# Patient Record
Sex: FEMALE | Race: WHITE | NOT HISPANIC OR LATINO | Employment: UNEMPLOYED | URBAN - METROPOLITAN AREA
[De-identification: names, ages, dates, MRNs, and addresses within clinical notes are randomized per-mention and may not be internally consistent; named-entity substitution may affect disease eponyms.]

---

## 2017-06-26 ENCOUNTER — HOSPITAL ENCOUNTER (OUTPATIENT)
Dept: MAMMOGRAPHY | Facility: CLINIC | Age: 52
Discharge: HOME | End: 2017-06-26
Attending: NURSE PRACTITIONER | Admitting: NURSE PRACTITIONER
Payer: COMMERCIAL

## 2017-06-26 DIAGNOSIS — Z12.31 VISIT FOR SCREENING MAMMOGRAM: ICD-10-CM

## 2017-06-26 PROCEDURE — G0202 SCR MAMMO BI INCL CAD: HCPCS

## 2017-08-09 ENCOUNTER — TELEPHONE (OUTPATIENT)
Dept: FAMILY MEDICINE | Facility: CLINIC | Age: 52
End: 2017-08-09

## 2017-08-09 DIAGNOSIS — E78.5 HYPERLIPIDEMIA LDL GOAL <160: ICD-10-CM

## 2017-08-09 DIAGNOSIS — E16.2 HYPOGLYCEMIA: Primary | ICD-10-CM

## 2017-08-09 NOTE — TELEPHONE ENCOUNTER
Reason for Call: Request for an order or referral:    Order or referral being requested: Pt is coming in early for her fasting labs, prior to her appt 08/22 with ALLISON Reyna.  Please place orders.  No need to call patient back, unless there are questions or problems.      Date needed: at your convenience    Has the patient been seen by the PCP for this problem? NO    Additional comments:     Phone number Patient can be reached at:  Cell number on file:    Telephone Information:   Mobile [phone]       Best Time:  any    Can we leave a detailed message on this number?  YES    Call taken on 8/9/2017 at 10:17 AM by Katerine Machado

## 2017-09-20 ENCOUNTER — RADIANT APPOINTMENT (OUTPATIENT)
Dept: GENERAL RADIOLOGY | Facility: CLINIC | Age: 52
End: 2017-09-20
Attending: NURSE PRACTITIONER
Payer: COMMERCIAL

## 2017-09-20 ENCOUNTER — OFFICE VISIT (OUTPATIENT)
Dept: FAMILY MEDICINE | Facility: CLINIC | Age: 52
End: 2017-09-20
Payer: COMMERCIAL

## 2017-09-20 VITALS
SYSTOLIC BLOOD PRESSURE: 112 MMHG | HEIGHT: 66 IN | HEART RATE: 67 BPM | DIASTOLIC BLOOD PRESSURE: 80 MMHG | TEMPERATURE: 98.4 F | BODY MASS INDEX: 24.62 KG/M2 | WEIGHT: 153.2 LBS

## 2017-09-20 DIAGNOSIS — M70.61 TROCHANTERIC BURSITIS OF BOTH HIPS: ICD-10-CM

## 2017-09-20 DIAGNOSIS — Z23 NEED FOR PROPHYLACTIC VACCINATION AND INOCULATION AGAINST INFLUENZA: ICD-10-CM

## 2017-09-20 DIAGNOSIS — M25.561 RIGHT KNEE PAIN, UNSPECIFIED CHRONICITY: ICD-10-CM

## 2017-09-20 DIAGNOSIS — M25.562 LEFT KNEE PAIN, UNSPECIFIED CHRONICITY: ICD-10-CM

## 2017-09-20 DIAGNOSIS — E16.2 HYPOGLYCEMIA: ICD-10-CM

## 2017-09-20 DIAGNOSIS — E78.5 HYPERLIPIDEMIA LDL GOAL <160: ICD-10-CM

## 2017-09-20 DIAGNOSIS — M25.50 MULTIPLE JOINT PAIN: ICD-10-CM

## 2017-09-20 DIAGNOSIS — M70.62 TROCHANTERIC BURSITIS OF BOTH HIPS: ICD-10-CM

## 2017-09-20 DIAGNOSIS — Z00.01 ENCOUNTER FOR ROUTINE ADULT HEALTH EXAMINATION WITH ABNORMAL FINDINGS: Primary | ICD-10-CM

## 2017-09-20 DIAGNOSIS — E55.9 VITAMIN D DEFICIENCY: ICD-10-CM

## 2017-09-20 LAB
CHOLEST SERPL-MCNC: 264 MG/DL
DEPRECATED CALCIDIOL+CALCIFEROL SERPL-MC: 35 UG/L (ref 20–75)
GLUCOSE SERPL-MCNC: 86 MG/DL (ref 70–99)
HDLC SERPL-MCNC: 67 MG/DL
LDLC SERPL CALC-MCNC: 152 MG/DL
NONHDLC SERPL-MCNC: 197 MG/DL
TRIGL SERPL-MCNC: 226 MG/DL

## 2017-09-20 PROCEDURE — 36415 COLL VENOUS BLD VENIPUNCTURE: CPT | Performed by: NURSE PRACTITIONER

## 2017-09-20 PROCEDURE — 99213 OFFICE O/P EST LOW 20 MIN: CPT | Mod: 25 | Performed by: NURSE PRACTITIONER

## 2017-09-20 PROCEDURE — 82306 VITAMIN D 25 HYDROXY: CPT | Performed by: NURSE PRACTITIONER

## 2017-09-20 PROCEDURE — 99396 PREV VISIT EST AGE 40-64: CPT | Mod: 25 | Performed by: NURSE PRACTITIONER

## 2017-09-20 PROCEDURE — 73560 X-RAY EXAM OF KNEE 1 OR 2: CPT | Mod: LT

## 2017-09-20 PROCEDURE — 90471 IMMUNIZATION ADMIN: CPT | Performed by: NURSE PRACTITIONER

## 2017-09-20 PROCEDURE — 80061 LIPID PANEL: CPT | Performed by: NURSE PRACTITIONER

## 2017-09-20 PROCEDURE — 82947 ASSAY GLUCOSE BLOOD QUANT: CPT | Performed by: NURSE PRACTITIONER

## 2017-09-20 PROCEDURE — 90686 IIV4 VACC NO PRSV 0.5 ML IM: CPT | Performed by: NURSE PRACTITIONER

## 2017-09-20 NOTE — PATIENT INSTRUCTIONS
Preventive Health Recommendations  Female Ages 40 to 49    Yearly exam:     See your health care provider every year in order to  1. Review health changes.   2. Discuss preventive care.    3. Review your medicines if your doctor prescribed any.      Get a Pap test every three years (unless you have an abnormal result and your provider advises testing more often).      If you get Pap tests with HPV test, you only need to test every 5 years, unless you have an abnormal result. You do not need a Pap test if your uterus was removed (hysterectomy) and you have not had cancer.      You should be tested each year for STDs (sexually transmitted diseases), if you're at risk.       Ask your doctor if you should have a mammogram.      Have a colonoscopy (test for colon cancer) if someone in your family has had colon cancer or polyps before age 50.       Have a cholesterol test every 5 years.       Have a diabetes test (fasting glucose) after age 45. If you are at risk for diabetes, you should have this test every 3 years.    Shots: Get a flu shot each year. Get a tetanus shot every 10 years.     Nutrition:     Eat at least 5 servings of fruits and vegetables each day.    Eat whole-grain bread, whole-wheat pasta and brown rice instead of white grains and rice.    Talk to your provider about Calcium and Vitamin D.     Lifestyle    Exercise at least 150 minutes a week (an average of 30 minutes a day, 5 days a week). This will help you control your weight and prevent disease.    Limit alcohol to one drink per day.    No smoking.     Wear sunscreen to prevent skin cancer.  See your dentist every six months for an exam and cleaning.  Knee Pain  Knee pain is very common. It s especially common in active people who put a lot of pressure on their knees, like runners. It affects women more often than men.  Your kneecap (patella) is a thick, round bone. It covers and protects the front portion of your knee joint. It moves along a  groove in your thighbone (femur) as part of the patellofemoral joint. A layer of cartilage surrounds the underside of your kneecap. This layer protects it from grinding against your femur.  When this cartilage softens and breaks down, it can cause knee pain. This is partly because of repetitive stress. The stress irritates the lining of the joint. This causes pain in the underlying bone.  What causes knee pain?  Many things can cause knee pain. You may have more than one cause. Some of these include:  Overuse of the knee joint  The kneecap doesn t line up with the tissue around it  Damage to small nerves in the area  Damage to the ligament-like structure that holds the kneecap in place (retinaculum)  Breakdown of the bone under the cartilage  Swelling in the soft tissues around the kneecap  Injury  You might be more likely to have knee pain if you:  Exercise a lot  Recently increased the intensity of your workouts  Have a body mass index (BMI) greater than 25  Have poor alignment of your kneecap  Walk with your feet turned overly outward or inward  Have weakness in surrounding muscle groups (inner quad or hip adductor muscles)  Have too much tightness in surrounding muscle groups (hamstrings or iliotibial band)  Have a recent history of injury to the area  Are female  Symptoms of knee pain  This type of knee pain is a dull, aching pain in the front of the knee in the area under and around the kneecap. This pain may start quickly or slowly. Your pain might be worse when you squat, run, or sit for a long time. You might also sometimes feel like your knee is giving out. You may have symptoms in one or both of your knees.  Diagnosing knee pain  Your healthcare provider will ask about your medical history and your symptoms. Be sure to describe any activities that make your knee pain worse. He or she will look at your knee. This will include tests of your range of motion, strength, and areas of pain of your knee. Your  knee alignment will be checked.  Your healthcare provider will need to rule out other causes of your knee pain, such as arthritis. You may need an imaging test, such as an X-ray or MRI.  Treatment for knee pain  Treatments that can help ease your symptoms may include:  Avoiding activities for a while that make your pain worse, returning to activity over time  Icing the outside of your knee when it causes you pain  Taking over-the-counter pain medicine  Wearing a knee brace or taping your knee to support it  Wearing special shoe inserts to help keep your feet in the proper alignment  Doing special exercises to stretch and strengthen the muscles around your hip and your knee  These steps help most people manage knee pain. But some cases of knee pain need to be treated with surgery. You may need surgery right away. Or you may need it later if other treatments don t work. Your healthcare provider may refer you to an orthopedic surgeon. He or she will talk with you about your choices.  Preventing knee pain  Losing weight and correcting excess muscle tightness or muscle weakness may help lower your risk.  In some cases, you can prevent knee pain. To help prevent a flare-up of knee pain, you do these things:  Regularly do all the exercises your doctor or physical therapist advises  Support your knee as advised by your doctor or physical therapist  Increase training gradually, and ease up on training when needed  Have an expert check your gait for running or other sporting activities  Stretch properly before and after exercise  Replace your running shoes regularly  Lose excess weight     When to call your healthcare provider  Call your healthcare provider right away if:  Your symptoms don t get better after a few weeks of treatment  You have any new symptoms   Date Last Reviewed: 4/1/2017 2000-2017 [employer]. [address] [place] [address], [place] [address]. All rights reserved. This information is not intended  as a substitute for professional medical care. Always follow your healthcare professional's instructions.

## 2017-09-20 NOTE — PROGRESS NOTES
"   SUBJECTIVE:   CC: Pearl Gibson is an 51 year old woman who presents for preventive health visit.     Healthy Habits:    Do you get at least three servings of calcium containing foods daily (dairy, green leafy vegetables, etc.)? no, taking calcium and/or vitamin D supplement: yes - calcium and vitamin d    Amount of exercise or daily activities, outside of work: 3-4 day(s) per week, weight training and walks    Problems taking medications regularly not applicable    Medication side effects: Na    Have you had an eye exam in the past two years? yes    Do you see a dentist twice per year? yes    Do you have sleep apnea, excessive snoring or daytime drowsiness?yes, tired during day due to not sleeping at night, achey hips, knees and back      Joint Pain    Onset: 5 yrs, progressively getting worse     Description:   Location: knees, lower back and hips  Character: Sharp, Dull ache, Stabbing and Burning in hips    Intensity:knees 7-8/10., achiness around a 4-5    Progression of Symptoms: worse    Accompanying Signs & Symptoms:    Other symptoms: numbness in lower back, between shoulder blades and hips    History:   Previous similar pain: no       Precipitating factors:   Trauma or overuse: YES- hurt knees, right during softball age 20, left fell on ice - mri done    Alleviating factors:  Improved by: heat, stretching, exercises and NSAID - all give temporary relief    Therapies Tried and outcome: none    Bilateral knee pain - reports occasionally will \"give way\" will occur one at a time.  Has had MRI on left knee after injury 20 years ago. Fell in driveway and twisted leg/knee.  Unsure if MRI was abnormal - was told she had a \"stress fracture\" but never treated.  Never had xrays and nothing recently.       Today's PHQ-2 Score: PHQ-2 ( 1999 [employer]) 9/20/2017 1/6/2015   Q1: Little interest or pleasure in doing things 0 0   Q2: Feeling down, depressed or hopeless 0 0   PHQ-2 Score 0 0     Abuse: Current or " Past(Physical, Sexual or Emotional)- No  Do you feel safe in your environment - Yes  Social History   Substance Use Topics     Smoking status: Never Smoker     Smokeless tobacco: Never Used      Comment: none in home     Alcohol use Yes      Comment: 2 drinks a week     The patient does not drink >3 drinks per day nor >7 drinks per week.    Reviewed orders with patient.  Reviewed health maintenance and updated orders accordingly - Yes  Labs reviewed in EPIC  BP Readings from Last 3 Encounters:   09/20/17 112/80   01/06/15 112/64   05/09/13 99/60    Wt Readings from Last 3 Encounters:   09/20/17 153 lb 3.2 oz (69.5 kg)   01/06/15 143 lb (64.9 kg)   05/09/13 138 lb (62.6 kg)                  Patient Active Problem List   Diagnosis     Dermatitis due to cosmetics     Hypoglycemia     Pain in limb     HYPERLIPIDEMIA LDL GOAL <160     Family history of polyps in the colon     Vitamin D deficiency     Past Surgical History:   Procedure Laterality Date     SURGICAL HISTORY OF -   10/16/1997    D&C       Social History   Substance Use Topics     Smoking status: Never Smoker     Smokeless tobacco: Never Used      Comment: none in home     Alcohol use Yes      Comment: 2 drinks a week     Family History   Problem Relation Age of Onset     Arthritis Mother      CEREBROVASCULAR DISEASE Mother      CANCER Father      SKIN     HEART DISEASE Father      heart surgery - ?valve     GASTROINTESTINAL DISEASE Father      colon polyps     Lipids Sister      Alcohol/Drug Brother      Allergies Sister      Genitourinary Problems Sister      KIDNEY STONES     Obesity Sister      Obesity Sister      Cancer - colorectal Maternal Grandmother      Breast Cancer No family hx of          Current Outpatient Prescriptions   Medication Sig Dispense Refill     Cholecalciferol (VITAMIN D3) 2000 UNITS TABS Take  by mouth. 1 tablet daily       OMEGA-3 CAPS   OR 2 tablets daily  0     CALTRATE 600 + D 600-200 MG-IU OR TABS 1 TABLET DAILY       VITAMIN C  500 MG OR TABS 1 TABLET TWICE DAILY       Multiple Vitamins-Minerals (MULTIVITAMIN OR) Take  by mouth. 1 tablet daily       Allergies   Allergen Reactions     Augmentin Rash     Cephalosporins Hives     Penicillins Hives     systemic     Sulfa Drugs Rash     Recent Labs   Lab Test  17   0814  01/06/15   0908  13   0750  11   1111   LDL  152*  119  102   --    HDL  67  70  69   --    TRIG  226*  153*  109   --    TSH   --    --   2.09  1.03              Patient under age 50, mutual decision reflected in health maintenance.        Pertinent mammograms are reviewed under the imaging tab.  History of abnormal Pap smear:   NO - age 30- 65 PAP every 3 years recommended  Last 3 Pap Results:   PAP (no units)   Date Value   2015 NIL   2011 NIL   2008 NIL       Reviewed and updated as needed this visit by clinical staffTobacco  Allergies  Med Hx  Surg Hx  Fam Hx  Soc Hx        Reviewed and updated as needed this visit by Provider          Past Medical History:   Diagnosis Date     Cellulitis and abscess of unspecified site -2005      Past Surgical History:   Procedure Laterality Date     SURGICAL HISTORY OF -   10/16/1997    D&C     Obstetric History       T2      L2     SAB0   TAB0   Ectopic0   Multiple0   Live Births0       # Outcome Date GA Lbr Moy/2nd Weight Sex Delivery Anes PTL Lv   3 Term            2 Term            1 SAB                     ROS:  C: NEGATIVE for fever, chills, change in weight  I: NEGATIVE for worrisome rashes, moles or lesions  E: NEGATIVE for vision changes or irritation  ENT: NEGATIVE for ear, mouth and throat problems  R: NEGATIVE for significant cough or SOB  B: NEGATIVE for masses, tenderness or discharge  CV: NEGATIVE for chest pain, palpitations or peripheral edema  GI: NEGATIVE for nausea, abdominal pain, heartburn, or change in bowel habits  : NEGATIVE for unusual urinary or vaginal symptoms. Periods are  "regular.  MUSCULOSKELETAL:see HPI above  N: NEGATIVE for weakness, dizziness or paresthesias  P: NEGATIVE for changes in mood or affect    OBJECTIVE:   /80  Pulse 67  Temp 98.4  F (36.9  C) (Tympanic)  Ht 5' 6.25\" (1.683 m)  Wt 153 lb 3.2 oz (69.5 kg)  BMI 24.54 kg/m2  EXAM:  GENERAL: healthy, alert and no distress  EYES: Eyes grossly normal to inspection, PERRL and conjunctivae and sclerae normal  HENT: ear canals and TM's normal, nose and mouth without ulcers or lesions  NECK: no adenopathy, no asymmetry, masses, or scars and thyroid normal to palpation  RESP: lungs clear to auscultation - no rales, rhonchi or wheezes  CV: regular rate and rhythm, normal S1 S2, no S3 or S4, no murmur, click or rub, no peripheral edema and peripheral pulses strong  ABDOMEN: soft, nontender, no hepatosplenomegaly, no masses and bowel sounds normal  MS: gait normal, no ataxia, RLE exam shows normal strength and muscle mass, no erythema, induration, or nodules, ROM of all joints is normal, no evidence of joint instability and with tenderness at prepatellar region none at joint line some clicking at lateral joint line on passive ROM, LLE exam shows normal strength and muscle mass, no erythema, induration, or nodules, no evidence of joint effusion, ROM of all joints is normal, no evidence of joint instability and tenderness at prepatellar region, spine exam shows no scoliosis and ROM is normal and hip exam with tenderness over great trochanteric bursa full ROM no clicking or tenderness on exam.  SKIN: no suspicious lesions or rashes  NEURO: Normal strength and tone, mentation intact and speech normal  PSYCH: mentation appears normal, affect normal/bright    ASSESSMENT/PLAN:   1. Encounter for routine adult health examination with abnormal findings       2. Trochanteric bursitis of both hips  Discussed stretching, ice, activity.  Follow up if not improving with conservative measures.    3. Multiple joint pain   Suspected due " "to bilateral knee pain and symptoms causing hip, back symptoms.    4. Left knee pain, unspecified chronicity   Multiple injuries in the past 20 + years ago.   NO recent imaging.  Ongoing pain - giving way instability per patient.    - XR Knee Bilateral 1/2 Views; Future  - ORTHO  REFERRAL  - MR Knee Left w/o Contrast; Future  - MR Knee Right w/o Contrast; Future    5. Right knee pain, unspecified chronicity  Same as above per left knee.    - XR Knee Bilateral 1/2 Views; Future  - ORTHO  REFERRAL  - MR Knee Left w/o Contrast; Future  - MR Knee Right w/o Contrast; Future    6. Hyperlipidemia LDL goal <160     - **Lipid panel reflex to direct LDL FUTURE anytime    7. Hypoglycemia     - Glucose    8. Vitamin D deficiency     - Vitamin D Deficiency    9. Need for prophylactic vaccination and inoculation against influenza     - FLU VACCINE, 3 YRS +, IM (QUADRIVALENT W/PRESERVATIVES/MULTI-DOSE) [84160]  - Vaccine Administration, Initial [78788]    COUNSELING:   Reviewed preventive health counseling, as reflected in patient instructions       Regular exercise       Healthy diet/nutrition       Vision screening         reports that she has never smoked. She has never used smokeless tobacco.    Estimated body mass index is 24.54 kg/(m^2) as calculated from the following:    Height as of this encounter: 5' 6.25\" (1.683 m).    Weight as of this encounter: 153 lb 3.2 oz (69.5 kg).         Counseling Resources:  ATP IV Guidelines  Pooled Cohorts Equation Calculator  Breast Cancer Risk Calculator  FRAX Risk Assessment  ICSI Preventive Guidelines  Dietary Guidelines for Americans, 2010  USDA's MyPlate  ASA Prophylaxis  Lung CA Screening    Leola Reyna NP  [facility]  "

## 2017-09-20 NOTE — PROGRESS NOTES
Injectable Influenza Immunization Documentation    1.  Is the person to be vaccinated sick today?   No    2. Does the person to be vaccinated have an allergy to a component   of the vaccine?   No    3. Has the person to be vaccinated ever had a serious reaction   to influenza vaccine in the past?   No    4. Has the person to be vaccinated ever had Guillain-Barré syndrome?   No    Form completed by Linda Barroso CMA

## 2017-09-20 NOTE — NURSING NOTE
"Chief Complaint   Patient presents with     Physical     Pt had labs done this morning.     Flu Shot       Initial /80  Pulse 67  Temp 98.4  F (36.9  C) (Tympanic)  Ht 5' 6.25\" (1.683 m)  Wt 153 lb 3.2 oz (69.5 kg)  BMI 24.54 kg/m2 Estimated body mass index is 24.54 kg/(m^2) as calculated from the following:    Height as of this encounter: 5' 6.25\" (1.683 m).    Weight as of this encounter: 153 lb 3.2 oz (69.5 kg).  Medication Reconciliation: complete  Linda Barroso CMA    "

## 2017-09-20 NOTE — MR AVS SNAPSHOT
After Visit Summary   9/20/2017    Pearl Gibson    MRN: [id number]           Patient Information     Date Of Birth          1965        Visit Information        Provider Department      9/20/2017 8:20 AM Leola Reyna NP [facility]        Today's Diagnoses     Encounter for routine adult health examination with abnormal findings    -  1    Hyperlipidemia LDL goal <160        Hypoglycemia        Multiple joint pain        Left knee pain, unspecified chronicity        Right knee pain, unspecified chronicity        Vitamin D deficiency          Care Instructions      Preventive Health Recommendations  Female Ages 40 to 49    Yearly exam:     See your health care provider every year in order to  1. Review health changes.   2. Discuss preventive care.    3. Review your medicines if your doctor prescribed any.      Get a Pap test every three years (unless you have an abnormal result and your provider advises testing more often).      If you get Pap tests with HPV test, you only need to test every 5 years, unless you have an abnormal result. You do not need a Pap test if your uterus was removed (hysterectomy) and you have not had cancer.      You should be tested each year for STDs (sexually transmitted diseases), if you're at risk.       Ask your doctor if you should have a mammogram.      Have a colonoscopy (test for colon cancer) if someone in your family has had colon cancer or polyps before age 50.       Have a cholesterol test every 5 years.       Have a diabetes test (fasting glucose) after age 45. If you are at risk for diabetes, you should have this test every 3 years.    Shots: Get a flu shot each year. Get a tetanus shot every 10 years.     Nutrition:     Eat at least 5 servings of fruits and vegetables each day.    Eat whole-grain bread, whole-wheat pasta and brown rice instead of white grains and rice.    Talk to your provider about Calcium and Vitamin D.      Lifestyle    Exercise at least 150 minutes a week (an average of 30 minutes a day, 5 days a week). This will help you control your weight and prevent disease.    Limit alcohol to one drink per day.    No smoking.     Wear sunscreen to prevent skin cancer.  See your dentist every six months for an exam and cleaning.  Knee Pain  Knee pain is very common. It s especially common in active people who put a lot of pressure on their knees, like runners. It affects women more often than men.  Your kneecap (patella) is a thick, round bone. It covers and protects the front portion of your knee joint. It moves along a groove in your thighbone (femur) as part of the patellofemoral joint. A layer of cartilage surrounds the underside of your kneecap. This layer protects it from grinding against your femur.  When this cartilage softens and breaks down, it can cause knee pain. This is partly because of repetitive stress. The stress irritates the lining of the joint. This causes pain in the underlying bone.  What causes knee pain?  Many things can cause knee pain. You may have more than one cause. Some of these include:  Overuse of the knee joint  The kneecap doesn t line up with the tissue around it  Damage to small nerves in the area  Damage to the ligament-like structure that holds the kneecap in place (retinaculum)  Breakdown of the bone under the cartilage  Swelling in the soft tissues around the kneecap  Injury  You might be more likely to have knee pain if you:  Exercise a lot  Recently increased the intensity of your workouts  Have a body mass index (BMI) greater than 25  Have poor alignment of your kneecap  Walk with your feet turned overly outward or inward  Have weakness in surrounding muscle groups (inner quad or hip adductor muscles)  Have too much tightness in surrounding muscle groups (hamstrings or iliotibial band)  Have a recent history of injury to the area  Are female  Symptoms of knee pain  This type of knee  pain is a dull, aching pain in the front of the knee in the area under and around the kneecap. This pain may start quickly or slowly. Your pain might be worse when you squat, run, or sit for a long time. You might also sometimes feel like your knee is giving out. You may have symptoms in one or both of your knees.  Diagnosing knee pain  Your healthcare provider will ask about your medical history and your symptoms. Be sure to describe any activities that make your knee pain worse. He or she will look at your knee. This will include tests of your range of motion, strength, and areas of pain of your knee. Your knee alignment will be checked.  Your healthcare provider will need to rule out other causes of your knee pain, such as arthritis. You may need an imaging test, such as an X-ray or MRI.  Treatment for knee pain  Treatments that can help ease your symptoms may include:  Avoiding activities for a while that make your pain worse, returning to activity over time  Icing the outside of your knee when it causes you pain  Taking over-the-counter pain medicine  Wearing a knee brace or taping your knee to support it  Wearing special shoe inserts to help keep your feet in the proper alignment  Doing special exercises to stretch and strengthen the muscles around your hip and your knee  These steps help most people manage knee pain. But some cases of knee pain need to be treated with surgery. You may need surgery right away. Or you may need it later if other treatments don t work. Your healthcare provider may refer you to an orthopedic surgeon. He or she will talk with you about your choices.  Preventing knee pain  Losing weight and correcting excess muscle tightness or muscle weakness may help lower your risk.  In some cases, you can prevent knee pain. To help prevent a flare-up of knee pain, you do these things:  Regularly do all the exercises your doctor or physical therapist advises  Support your knee as advised by your  doctor or physical therapist  Increase training gradually, and ease up on training when needed  Have an expert check your gait for running or other sporting activities  Stretch properly before and after exercise  Replace your running shoes regularly  Lose excess weight     When to call your healthcare provider  Call your healthcare provider right away if:  Your symptoms don t get better after a few weeks of treatment  You have any new symptoms   Date Last Reviewed: 4/1/2017 2000-2017 [employer]. [address] [place] [address], [place] [address]. All rights reserved. This information is not intended as a substitute for professional medical care. Always follow your healthcare professional's instructions.                  Follow-ups after your visit        Additional Services     ORTHO  REFERRAL       [facility] is referring you to the Orthopedic  Services at [facility].       The  Representative will assist you in the coordination of your Orthopedic and Musculoskeletal Care as prescribed by your physician.    The  Representative will call you within 1 business day to help schedule your appointment, or you may contact the  Representative at:    All areas ~ [phone]     Type of Referral : Surgical / Specialist       Timeframe requested: Within 2 weeks    Coverage of these services is subject to the terms and limitations of your health insurance plan.  Please call member services at your health plan with any benefit or coverage questions.      If X-rays, CT or MRI's have been performed, please contact the facility where they were done to arrange for , prior to your scheduled appointment.  Please bring this referral request to your appointment and present it to your specialist.                  Future tests that were ordered for you today     Open Future Orders        Priority Expected Expires Ordered    MR Knee Left  "w/o Contrast Routine  9/20/2018 9/20/2017    MR Knee Right w/o Contrast Routine  9/20/2018 9/20/2017    XR Knee Bilateral 1/2 Views Routine 9/20/2017 9/20/2018 9/20/2017            Who to contact     If you have questions or need follow up information about today's clinic visit or your schedule please contact [facility] directly at [phone].  Normal or non-critical lab and imaging results will be communicated to you by [employer], letter or phone within 4 business days after the clinic has received the results. If you do not hear from us within 7 days, please contact the clinic through [employer] or phone. If you have a critical or abnormal lab result, we will notify you by phone as soon as possible.  Submit refill requests through [employer] or call your pharmacy and they will forward the refill request to us. Please allow 3 business days for your refill to be completed.          Additional Information About Your Visit        [employer] Information     [employer] gives you secure access to your electronic health record. If you see a primary care provider, you can also send messages to your care team and make appointments. If you have questions, please call your primary care clinic.  If you do not have a primary care provider, please call [phone] and they will assist you.        Care EveryWhere ID     This is your Care EveryWhere ID. This could be used by other organizations to access your [facility] medical records  [id number]        Your Vitals Were     Pulse Temperature Height BMI (Body Mass Index)          67 98.4  F (36.9  C) (Tympanic) 5' 6.25\" (1.683 m) 24.54 kg/m2         Blood Pressure from Last 3 Encounters:   09/20/17 112/80   01/06/15 112/64   05/09/13 99/60    Weight from Last 3 Encounters:   09/20/17 153 lb 3.2 oz (69.5 kg)   01/06/15 143 lb (64.9 kg)   05/09/13 138 lb (62.6 kg)              We Performed the Following     **Lipid panel reflex to direct LDL FUTURE anytime     Glucose     ORTHO " [facility] REFERRAL     Vitamin D Deficiency        Primary Care Provider Office Phone # Fax #    Leola Reyna, EIELEN [phone] [address] [facility] [address]        Equal Access to Services     RADAMES GARCIA : Hadii aad ku hadraffaeleo Soosorioali, waaxda luqadaha, qaybta kaalmada adeegyada, waxsona kimblen noradevora baires jaki taylor. So [facility] [phone].    ATENCIÓN: Si habla español, tiene a benson disposición servicios gratuitos de asistencia lingüística. Llame al [phone].    We comply with applicable federal civil rights laws and [place] laws. We do not discriminate on the basis of race, color, national origin, age, disability sex, sexual orientation or gender identity.            Thank you!     Thank you for choosing [facility]  for your care. Our goal is always to provide you with excellent care. Hearing back from our patients is one way we can continue to improve our services. Please take a few minutes to complete the written survey that you may receive in the mail after your visit with us. Thank you!             Your Updated Medication List - Protect others around you: Learn how to safely use, store and throw away your medicines at www.disposemymeds.org.          This list is accurate as of: 9/20/17  9:29 AM.  Always use your most recent med list.                   Brand Name Dispense Instructions for use Diagnosis    ascorbic acid 500 MG tablet    VITAMIN C     1 TABLET TWICE DAILY        CALTRATE 600 + D 600-200 MG-IU Tabs      1 TABLET DAILY        MULTIVITAMIN PO      Take  by mouth. 1 tablet daily        OMEGA-3 CAPS   OR      2 tablets daily        Vitamin D3 2000 UNITS Tabs      Take  by mouth. 1 tablet daily

## 2017-09-21 ENCOUNTER — MYC MEDICAL ADVICE (OUTPATIENT)
Dept: FAMILY MEDICINE | Facility: CLINIC | Age: 52
End: 2017-09-21

## 2017-09-21 DIAGNOSIS — Z12.11 SPECIAL SCREENING FOR MALIGNANT NEOPLASMS, COLON: Primary | ICD-10-CM

## 2017-09-21 NOTE — PROGRESS NOTES
All of your lab results are normal except triglycerides are elevated.    To keep triglycerides in check,  watch sugar, juice, excess fruit, bread, pasta, rice and  cereal. Increase exercise  and Omega 3 (fish oil supplements) with  meals.    Please notify patient of results.  HUYEN Muller

## 2017-09-27 PROCEDURE — 82274 ASSAY TEST FOR BLOOD FECAL: CPT | Performed by: NURSE PRACTITIONER

## 2017-09-28 DIAGNOSIS — Z12.11 SPECIAL SCREENING FOR MALIGNANT NEOPLASMS, COLON: ICD-10-CM

## 2017-09-28 LAB — HEMOCCULT STL QL IA: NEGATIVE

## 2018-03-10 ENCOUNTER — HEALTH MAINTENANCE LETTER (OUTPATIENT)
Age: 53
End: 2018-03-10

## 2018-08-08 ENCOUNTER — HOSPITAL ENCOUNTER (OUTPATIENT)
Dept: MAMMOGRAPHY | Facility: CLINIC | Age: 53
Discharge: HOME | End: 2018-08-08
Attending: NURSE PRACTITIONER | Admitting: NURSE PRACTITIONER
Payer: COMMERCIAL

## 2018-08-08 DIAGNOSIS — Z12.31 VISIT FOR SCREENING MAMMOGRAM: ICD-10-CM

## 2018-08-08 PROCEDURE — 77067 SCR MAMMO BI INCL CAD: CPT

## 2019-08-09 ENCOUNTER — HOSPITAL ENCOUNTER (OUTPATIENT)
Dept: MAMMOGRAPHY | Facility: CLINIC | Age: 54
Discharge: HOME | End: 2019-08-09
Attending: NURSE PRACTITIONER | Admitting: NURSE PRACTITIONER
Payer: COMMERCIAL

## 2019-08-09 DIAGNOSIS — Z12.31 VISIT FOR SCREENING MAMMOGRAM: ICD-10-CM

## 2019-08-09 PROCEDURE — 77063 BREAST TOMOSYNTHESIS BI: CPT

## 2019-08-13 ENCOUNTER — HOSPITAL ENCOUNTER (OUTPATIENT)
Dept: ULTRASOUND IMAGING | Facility: CLINIC | Age: 54
Discharge: HOME | End: 2019-08-13
Attending: FAMILY MEDICINE | Admitting: FAMILY MEDICINE
Payer: COMMERCIAL

## 2019-08-13 DIAGNOSIS — R92.8 ABNORMAL MAMMOGRAM: ICD-10-CM

## 2019-08-13 PROCEDURE — 76642 ULTRASOUND BREAST LIMITED: CPT | Mod: LT

## 2019-08-20 NOTE — DISCHARGE INSTRUCTIONS
Breast Biopsy Care Instructions      Site Care:    You may have some discomfort in the breast and may see some bruising at the needle site.    You will be given an ice pack which should be kept in place over the dressing until bedtime tonight.Always keep a gauze pad between your skin and the ice pack.    Wearing your bra continuously for 24 hours post biopsy will give optimal support to the biopsy site.    Activity:       You may go back to your normal routine.     No heavy lifting for 24 hours.    Diet and Medications:       You may go back to your regular diet.     Tylenol is the best choice to relieve your discomfort, the first 24 hours. If you have no bleeding on the first day, Ibuprofen (such as Advil, or Motrin), may be taken on the second day after for pain.     Do not take aspirin for 72 hours following your biopsy.    Questions:     If you have any questions about your procedure performed in Ultrasound, you may contact us at [phone].If you have any questions about your procedure performed in Mammography, you may contact us at [phone].    Results:       The pathology report on your biopsy is usually available within 72 hours. The Radiologist or your personal physician will call you with the results.     You will receive information about any further follow up from your physician.    Call your doctor if you have:       More than slight bleeding or significant pain, or experience swelling of the breast.     If your physician is unavailable, please call the Nurse Advice Line at [phone], or [facility] Emergency Department at [phone].      Patient:______________________________  Time:_________  Date:_____________    Instructor:____________________________   Time:_________  Date:_____________

## 2019-08-21 ENCOUNTER — HOSPITAL ENCOUNTER (OUTPATIENT)
Dept: MAMMOGRAPHY | Facility: CLINIC | Age: 54
End: 2019-08-21
Attending: NURSE PRACTITIONER
Payer: COMMERCIAL

## 2019-08-21 ENCOUNTER — HOSPITAL ENCOUNTER (OUTPATIENT)
Dept: MAMMOGRAPHY | Facility: CLINIC | Age: 54
Discharge: HOME | End: 2019-08-21
Attending: NURSE PRACTITIONER | Admitting: NURSE PRACTITIONER
Payer: COMMERCIAL

## 2019-08-21 DIAGNOSIS — N63.0 BREAST NODULE: ICD-10-CM

## 2019-08-21 DIAGNOSIS — R92.8 ABNORMAL MAMMOGRAM: ICD-10-CM

## 2019-08-21 PROCEDURE — 88305 TISSUE EXAM BY PATHOLOGIST: CPT | Mod: 26 | Performed by: RADIOLOGY

## 2019-08-21 PROCEDURE — 88305 TISSUE EXAM BY PATHOLOGIST: CPT | Performed by: RADIOLOGY

## 2019-08-21 PROCEDURE — 19081 BX BREAST 1ST LESION STRTCTC: CPT | Mod: LT

## 2019-08-21 PROCEDURE — 25000125 ZZHC RX 250: Performed by: NURSE PRACTITIONER

## 2019-08-21 PROCEDURE — 40000986 MA POST PROCEDURE LEFT

## 2019-08-21 RX ORDER — LIDOCAINE HYDROCHLORIDE 10 MG/ML
5 INJECTION, SOLUTION EPIDURAL; INFILTRATION; INTRACAUDAL; PERINEURAL ONCE
Status: COMPLETED | OUTPATIENT
Start: 2019-08-21 | End: 2019-08-21

## 2019-08-21 RX ADMIN — LIDOCAINE HYDROCHLORIDE 15 ML: 10 INJECTION, SOLUTION EPIDURAL; INFILTRATION; INTRACAUDAL; PERINEURAL at 13:23

## 2019-08-21 NOTE — IP AVS SNAPSHOT
[facility]  [address]  [facility] [place] [address]  Phone:  [phone]  Fax:  [phone]                                    After Visit Summary   8/21/2019    Pearl Gibson    MRN: [id number]           After Visit Summary Signature Page    I have received my discharge instructions, and my questions have been answered. I have discussed any challenges I see with this plan with the nurse or doctor.    ..........................................................................................................................................  Patient/Patient Representative Signature      ..........................................................................................................................................  Patient Representative Print Name and Relationship to Patient    ..................................................               ................................................  Date                                   Time    ..........................................................................................................................................  Reviewed by Signature/Title    ...................................................              ..............................................  Date                                               Time          22EPIC Rev 08/18

## 2019-08-21 NOTE — PROGRESS NOTES
RADIOLOGY PROCEDURE NOTE  Patient name: Pearl Gibson  MRN: [id number]  : 1965    Pre-procedure diagnosis: Left breast lesion.  Post-procedure diagnosis: Same    Procedure Date/Time: 2019  1:33 PM  Procedure: Stereotactic needle core biopsy.  Estimated blood loss: None  Specimen(s) collected with description: Six vacuum needle cores.  The patient tolerated the procedure well with no immediate complications.    See imaging dictation for procedural details.    Provider name: Renato Rivers MD  Assistant(s):None

## 2019-08-21 NOTE — IP AVS SNAPSHOT
MRN:[id number]                      After Visit Summary   8/21/2019    Pearl Gibson    MRN: [id number]           Visit Information        Provider Department      8/21/2019  1:00 PM [place] RAD; [facility]1 [facility]           Review of your medicines      Notice    Cannot display patient medications because the patient has not yet been checked in.           Protect others around you: Learn how to safely use, store and throw away your medicines at www.disposemymeds.org.       Follow-ups after your visit       Your next 10 appointments already scheduled    Aug 21, 2019  1:00 PM CDT  MA STEREOTACTIC BREAST BIOPSY VACUUM ASST LEFT with [facility]1, [place] RAD  [facility] ([facility]) [address]  [facility] [address] [phone]   How do I prepare for my exam? (Food and drink instructions)  No Food and Drink Restrictions.    How do I prepare for my exam? (Other instructions)  Do not use any powder, lotion or deodorant under your arms or on your breast. If you do, we will ask you to remove it before your exam.  Stop taking Coumadin (warfarin) 5 days before treatment. Restart the day after treatment.  If you take Plavix, Ticlid, Pletal or Persantine, ask your doctor if you should stop these medicines. You may need extra tests on the morning of your scan.    What should I wear: Wear comfortable clothing and a well-fitted bra to the clinic. (A sports bra is best.)    How long does the exam take:  * If having Stereotactic-guided biopsy this exam will take about 2 hours.  * If having Ultrasound-guided biopsy this exam will take about one hour.  * If having MRI-guided biopsy this exam will take about two hours.    What should I bring: Bring a list of your medicines, including vitamins, minerals and over-the-counter drugs. Bring any previous mammograms from other facilities or have them mailed to the breast center. It is best to leave valuables at home.    Do I need a  : No  is needed.    What do I need to tell my doctor:  Tell your care team if:  * You have any allergies.  * You are taking aspirin, ibuprofen, naproxen or any drug that could increase bleeding.    What should I do after the exam:  You may have some bleeding. We will put pressure on the biopsy site, followed by a bandage. You may also have an ice pack. Please wear a well-fitted bra for 24 hours for extra breast support. To reduce the risk of bleeding, take it easy for the rest of the day.    Do not do anything strenuous for 24 hours.    Your breast may feel tender, and you may have a bruise. Take Tylenol for pain relief if needed. Do not take aspirin for three days. If you have questions or concerns about your medicines, talk to your doctor. A small scar may form at the biopsy site.    What is this test: During this exam, a doctor uses imaging and a needle to take tissue samples from your breast. Lab experts then look at the samples under a microscope. A biopsy will tell us if the tissue is cancer or benign (not cancer). For 8 out of 10 patients, the biopsy shows no cancer.    Who should I call with questions: If you have any questions, please call the Imaging Department where you will have your exam. Directions, parking instructions, and other information is available on our website, [email or web address].     Aug 21, 2019  2:30 PM CDT  MA POST PROCEDURE LEFT with WYMADong  [facility] ([facility]) [address] [address]  [phone]   How do I prepare for my exam? (Food and drink instructions)  No Food and Drink Restrictions.    How do I prepare for my exam? (Other instructions)  Do not use any powder, lotion or deodorant under your arms or on your breast. If you do, we will ask you to remove it before your exam.    What should I wear: Wear comfortable, two-piece clothing.    How long does the exam take: Most scans will take 15 minutes.    What should I  "bring: Bring any previous mammograms from other facilities or have them mailed to the breast center.    Do I need a : No  is needed.    What do I need to tell my doctor: If you have any allergies, tell your care team.    What should I do after the exam: No restrictions, you may resume normal activities.    What is this test: This test is an x-ray of the breast to look for breast disease. The breast is pressed between two plates to flatten and spread the tissue. An X-ray is taken of the breast from different angles.    Who should I call with questions: If you have any questions, please call the Imaging Department where you will have your exam. Directions, parking instructions, and other information are available on our website, [email or web address].    Other information about my exam  Three-dimensional (3D) mammograms are available at [place] locations in [facility], [facility], [facility], [place], [facility] and [place]. [facility] locations include [place] and the [facility] in [place].    Benefits of 3D mammograms include:  * Improved rate of cancer detection  * Decreases your chance of having to go back for more tests, which means fewer:  * \"False-positive\" results (This means that there is an abnormal area but it isn't cancer.)  * Invasive testing procedures, such as a biopsy or surgery  * Can provide clearer images of the breast if you have dense breast tissue.    *3D mammography is an optional exam that anyone can have with a 2D mammogram. It doesn't replace or take the place of a 2D mammogram. 2D mammograms remain an effective screening test for all women. Not all insurance companies cover the cost of a 3D mammogram. Check with your insurance.        Care Instructions       Further instructions from your care team       Breast Biopsy Care Instructions      Site Care:    You may have some discomfort in the breast and may see some bruising at the needle " site.    You will be given an ice pack which should be kept in place over the dressing until bedtime tonight.Always keep a gauze pad between your skin and the ice pack.    Wearing your bra continuously for 24 hours post biopsy will give optimal support to the biopsy site.    Activity:       You may go back to your normal routine.     No heavy lifting for 24 hours.    Diet and Medications:       You may go back to your regular diet.     Tylenol is the best choice to relieve your discomfort, the first 24 hours. If you have no bleeding on the first day, Ibuprofen (such as Advil, or Motrin), may be taken on the second day after for pain.     Do not take aspirin for 72 hours following your biopsy.    Questions:     If you have any questions about your procedure performed in Ultrasound, you may contact us at [phone].If you have any questions about your procedure performed in Mammography, you may contact us at [phone].    Results:       The pathology report on your biopsy is usually available within 72 hours. The Radiologist or your personal physician will call you with the results.     You will receive information about any further follow up from your physician.    Call your doctor if you have:       More than slight bleeding or significant pain, or experience swelling of the breast.     If your physician is unavailable, please call the Nurse Advice Line at [phone], or [facility] Department at [phone].      Patient:______________________________  Time:_________  Date:_____________    Instructor:____________________________   Time:_________  Date:_____________     Additional Information About Your Visit       [employer] Information    [employer] gives you secure access to your electronic health record. If you see a primary care provider, you can also send messages to your care team and make appointments. If you have questions, please call your primary care clinic.  If you do not have a primary  care provider, please call [phone] and they will assist you.       Care EveryWhere ID    This is your Care EveryWhere ID. This could be used by other organizations to access your [facility] medical records  [id number]        Primary Care Provider Office Phone # Fax #    Leola ReynaEILEEN [phone]      Equal Access to Services    [facility] : Hadii aad ku hadasho Soomaali, waaxda luqadaha, qaybta kaalmada adeegyada, waxay idiin hayaan adeeg kharash lareyna . So [facility] [phone].    ATENCIÓN: Si habla español, tiene a benson disposición servicios gratuitos de asistencia lingüística. [facility] [phone].    We comply with applicable federal civil rights laws and [place] laws. We do not discriminate on the basis of race, color, national origin, age, disability, sex, sexual orientation, or gender identity.           Thank you!    Thank you for choosing [facility] for your care. Our goal is always to provide you with excellent care. Hearing back from our patients is one way we can continue to improve our services. Please take a few minutes to complete the written survey that you may receive in the mail after you visit with us. Thank you!         Medication List     Notice    Cannot display patient medications because the patient has not yet been checked in.

## 2019-08-23 LAB — COPATH REPORT: NORMAL

## 2019-08-26 ENCOUNTER — TELEPHONE (OUTPATIENT)
Dept: FAMILY MEDICINE | Facility: CLINIC | Age: 54
End: 2019-08-26

## 2019-08-26 NOTE — TELEPHONE ENCOUNTER
Reason for Call:  Request for results:    Name of test or procedure: Breast Biopsy - Pt has been eagerly awaiting results.  Please call patient ASAP    Date of test of procedure: 8/21/19    Location of the test or procedure: [facility] to leave the result message on voice mail or with a family member? NO    Phone number Patient can be reached at:  Home number on file [phone] (home)    Additional comments:     Call taken on 8/26/2019 at 4:02 PM by Katerine Machado

## 2019-08-27 NOTE — TELEPHONE ENCOUNTER
Called with results of breast biopsy.  Final pathology was negative for malignancy.    Would recommend yearly mammogram (3D) as findings showed fibrosis and can be more difficulty to see abnormalities.    Left VM to return call.    HUYEN Muller

## 2019-11-10 ENCOUNTER — MYC MEDICAL ADVICE (OUTPATIENT)
Dept: FAMILY MEDICINE | Facility: CLINIC | Age: 54
End: 2019-11-10

## 2020-02-10 ENCOUNTER — HEALTH MAINTENANCE LETTER (OUTPATIENT)
Age: 55
End: 2020-02-10

## 2020-02-21 ENCOUNTER — HOSPITAL ENCOUNTER (OUTPATIENT)
Dept: MAMMOGRAPHY | Facility: CLINIC | Age: 55
Discharge: HOME | End: 2020-02-21
Attending: NURSE PRACTITIONER | Admitting: NURSE PRACTITIONER
Payer: COMMERCIAL

## 2020-02-21 DIAGNOSIS — Z98.890 S/P BREAST BIOPSY: ICD-10-CM

## 2020-02-21 PROCEDURE — 77065 DX MAMMO INCL CAD UNI: CPT

## 2020-03-05 ENCOUNTER — OFFICE VISIT (OUTPATIENT)
Dept: FAMILY MEDICINE | Facility: CLINIC | Age: 55
End: 2020-03-05
Payer: COMMERCIAL

## 2020-03-05 VITALS
HEART RATE: 72 BPM | SYSTOLIC BLOOD PRESSURE: 124 MMHG | BODY MASS INDEX: 26.78 KG/M2 | OXYGEN SATURATION: 99 % | RESPIRATION RATE: 12 BRPM | TEMPERATURE: 97.1 F | HEIGHT: 66 IN | WEIGHT: 166.6 LBS | DIASTOLIC BLOOD PRESSURE: 88 MMHG

## 2020-03-05 DIAGNOSIS — H69.93 DYSFUNCTION OF BOTH EUSTACHIAN TUBES: ICD-10-CM

## 2020-03-05 DIAGNOSIS — Z23 NEED FOR PROPHYLACTIC VACCINATION AND INOCULATION AGAINST INFLUENZA: ICD-10-CM

## 2020-03-05 DIAGNOSIS — M25.561 PAIN IN BOTH KNEES, UNSPECIFIED CHRONICITY: ICD-10-CM

## 2020-03-05 DIAGNOSIS — N95.1 MENOPAUSAL SYNDROME (HOT FLASHES): ICD-10-CM

## 2020-03-05 DIAGNOSIS — N95.1 PERI-MENOPAUSE: ICD-10-CM

## 2020-03-05 DIAGNOSIS — Z00.00 ROUTINE GENERAL MEDICAL EXAMINATION AT A HEALTH CARE FACILITY: Primary | ICD-10-CM

## 2020-03-05 DIAGNOSIS — E78.5 HYPERLIPIDEMIA LDL GOAL <130: ICD-10-CM

## 2020-03-05 DIAGNOSIS — N63.0 LARGE MASS OF BREAST: ICD-10-CM

## 2020-03-05 DIAGNOSIS — N92.0 MENORRHAGIA WITH REGULAR CYCLE: ICD-10-CM

## 2020-03-05 DIAGNOSIS — M25.562 PAIN IN BOTH KNEES, UNSPECIFIED CHRONICITY: ICD-10-CM

## 2020-03-05 DIAGNOSIS — Z12.4 ENCOUNTER FOR SCREENING FOR CERVICAL CANCER: ICD-10-CM

## 2020-03-05 DIAGNOSIS — Z12.11 SPECIAL SCREENING FOR MALIGNANT NEOPLASMS, COLON: ICD-10-CM

## 2020-03-05 LAB
CHOLEST SERPL-MCNC: 312 MG/DL
ERYTHROCYTE [DISTWIDTH] IN BLOOD BY AUTOMATED COUNT: 12.4 % (ref 10–15)
GLUCOSE SERPL-MCNC: 90 MG/DL (ref 70–99)
HCT VFR BLD AUTO: 41.2 % (ref 35–47)
HDLC SERPL-MCNC: 62 MG/DL
HGB BLD-MCNC: 14 G/DL (ref 11.7–15.7)
IRON SATN MFR SERPL: 43 % (ref 15–46)
IRON SERPL-MCNC: 143 UG/DL (ref 35–180)
LDLC SERPL CALC-MCNC: 196 MG/DL
MCH RBC QN AUTO: 32.3 PG (ref 26.5–33)
MCHC RBC AUTO-ENTMCNC: 34 G/DL (ref 31.5–36.5)
MCV RBC AUTO: 95 FL (ref 78–100)
NONHDLC SERPL-MCNC: 250 MG/DL
PLATELET # BLD AUTO: 219 10E9/L (ref 150–450)
RBC # BLD AUTO: 4.33 10E12/L (ref 3.8–5.2)
TIBC SERPL-MCNC: 330 UG/DL (ref 240–430)
TRIGL SERPL-MCNC: 269 MG/DL
WBC # BLD AUTO: 4.7 10E9/L (ref 4–11)

## 2020-03-05 PROCEDURE — 90471 IMMUNIZATION ADMIN: CPT | Performed by: NURSE PRACTITIONER

## 2020-03-05 PROCEDURE — 99396 PREV VISIT EST AGE 40-64: CPT | Mod: 25 | Performed by: NURSE PRACTITIONER

## 2020-03-05 PROCEDURE — 85027 COMPLETE CBC AUTOMATED: CPT | Performed by: NURSE PRACTITIONER

## 2020-03-05 PROCEDURE — 87624 HPV HI-RISK TYP POOLED RSLT: CPT | Performed by: NURSE PRACTITIONER

## 2020-03-05 PROCEDURE — G0145 SCR C/V CYTO,THINLAYER,RESCR: HCPCS | Performed by: NURSE PRACTITIONER

## 2020-03-05 PROCEDURE — 83540 ASSAY OF IRON: CPT | Performed by: NURSE PRACTITIONER

## 2020-03-05 PROCEDURE — 90682 RIV4 VACC RECOMBINANT DNA IM: CPT | Performed by: NURSE PRACTITIONER

## 2020-03-05 PROCEDURE — 80061 LIPID PANEL: CPT | Performed by: NURSE PRACTITIONER

## 2020-03-05 PROCEDURE — 82947 ASSAY GLUCOSE BLOOD QUANT: CPT | Performed by: NURSE PRACTITIONER

## 2020-03-05 PROCEDURE — 99213 OFFICE O/P EST LOW 20 MIN: CPT | Mod: 25 | Performed by: NURSE PRACTITIONER

## 2020-03-05 PROCEDURE — 36415 COLL VENOUS BLD VENIPUNCTURE: CPT | Performed by: NURSE PRACTITIONER

## 2020-03-05 PROCEDURE — 83550 IRON BINDING TEST: CPT | Performed by: NURSE PRACTITIONER

## 2020-03-05 RX ORDER — FLUTICASONE PROPIONATE 50 MCG
1 SPRAY, SUSPENSION (ML) NASAL DAILY
Qty: 18.2 ML | Refills: 1 | Status: SHIPPED | OUTPATIENT
Start: 2020-03-05 | End: 2020-04-22

## 2020-03-05 RX ORDER — LEVONORGESTREL/ETHIN.ESTRADIOL 0.1-0.02MG
1 TABLET ORAL DAILY
Qty: 84 TABLET | Refills: 3 | Status: SHIPPED | OUTPATIENT
Start: 2020-03-05 | End: 2020-04-22

## 2020-03-05 ASSESSMENT — MIFFLIN-ST. JEOR: SCORE: 1382.44

## 2020-03-05 NOTE — PATIENT INSTRUCTIONS
Preventive Health Recommendations  Female Ages 50 - 64    Yearly exam: See your health care provider every year in order to  o Review health changes.   o Discuss preventive care.    o Review your medicines if your doctor has prescribed any.      Get a Pap test every three years (unless you have an abnormal result and your provider advises testing more often).    If you get Pap tests with HPV test, you only need to test every 5 years, unless you have an abnormal result.     You do not need a Pap test if your uterus was removed (hysterectomy) and you have not had cancer.    You should be tested each year for STDs (sexually transmitted diseases) if you're at risk.     Have a mammogram every 1 to 2 years.    Have a colonoscopy at age 50, or have a yearly FIT test (stool test). These exams screen for colon cancer.      Have a cholesterol test every 5 years, or more often if advised.    Have a diabetes test (fasting glucose) every three years. If you are at risk for diabetes, you should have this test more often.     If you are at risk for osteoporosis (brittle bone disease), think about having a bone density scan (DEXA).    Shots: Get a flu shot each year. Get a tetanus shot every 10 years.    Nutrition:     Eat at least 5 servings of fruits and vegetables each day.    Eat whole-grain bread, whole-wheat pasta and brown rice instead of white grains and rice.    Get adequate Calcium and Vitamin D.     Lifestyle    Exercise at least 150 minutes a week (30 minutes a day, 5 days a week). This will help you control your weight and prevent disease.    Limit alcohol to one drink per day.    No smoking.     Wear sunscreen to prevent skin cancer.     See your dentist every six months for an exam and cleaning.    See your eye doctor every 1 to 2 years.    Patient Education     Perimenopause  Menopause is when you stop having periods for good. For many women, this happens around age 50. The time of change before this is  called perimenopause. It may start in your late 30s or 40s. It can last for months or years. During this time, your body makes lower levels of female hormones. This causes certain changes in your body. You may already have begun to feel the effects of these changes. By taking steps to relieve symptoms, you can still feel good.    The menstrual cycle  Hormones are chemicals that have specific jobs in the body. A menstrual cycle is caused by changes in the levels of 2 female hormones. These hormones are estrogen and progesterone. They are made by the ovaries. In a normal cycle, estrogen creates a lining in the uterus to allow for pregnancy. The ovary then releases an egg. This is called ovulation. Progesterone levels start to go up. If the egg is not fertilized, progesterone levels go down. This causes the lining in the uterus to be shed. This is the bleeding that is your period.  Changes in hormones  As a woman ages, her ovaries begin to make hormones less regularly. In some months, there may not be ovulation. Without ovulation, progesterone isn't secreted so a normal period doesn't occur. The menstrual cycle will be harder to predict. Over time, the ovaries stop working. This can cause symptoms. Some women who have had their uterus taken out (hysterectomy) but still have ovaries may still have symptoms. When estrogen levels reach their lowest point, periods will stop completely. This is menopause.  Symptoms of perimenopause  The change in hormones can cause physical symptoms. It can also cause emotional symptoms. These may include:    Periods that come more or less often    Periods that are lighter or heavier than you re used to    Hot flashes, night sweats, or trouble sleeping    Vaginal dryness, which may make sex painful    Mood swings or fatigue    Palpitations    Sleep disturbances    Urinary symptoms including frequency and incontinence  Medicines that may help  Some medicines can help ease the effects of  perimenopause. These include:    Low-dose birth control pills. These often contain both estrogen and progesterone. They can help regulate your periods.    Hormone therapy (HT). This replaces some of the hormones your body has stopped making.    Antidepressants. These help balance brain chemicals that may decrease during this time. Signs of depression can include often feeling sad or hopeless. If you feel this way, be sure to talk to your healthcare provider.    Gabapentin. This is a medicine also used to treat seizures. This controls hot flashes and night sweats in some women.    Clonidine. This medicine may control hot flashes and night sweats.  Date Last Reviewed: 11/1/2017 2000-2019 [employer]. [address] [place] [address], [place] [address]. All rights reserved. This information is not intended as a substitute for professional medical care. Always follow your healthcare professional's instructions.

## 2020-03-05 NOTE — PROGRESS NOTES
"   SUBJECTIVE:   CC: Pearl Gibson is an 54 year old woman who presents for preventive health visit.     Healthy Habits:    Getting at least 3 servings of Calcium per day:  NO (taking a supplement )    Bi-annual eye exam:  Yes    Dental care twice a year:  Yes    Sleep apnea or symptoms of sleep apnea:  Daytime drowsiness (not sleeping because she is having hot flashes (thinks due to menopause) )    Diet:  Regular (no restrictions)    Frequency of exercise:  4-5 days/week    Duration of exercise:  Greater than 60 minutes    Taking medications regularly:  Not Applicable    Barriers to taking medications:  Not applicable    Medication side effects:  Not applicable    PHQ-2 Total Score:    Additional concerns today:  Yes (would like to discuss breast reduction, menopause, back and knee pain)  Musculoskeletal Problem     Patient would like to discuss breast reduction today, denies meeting with anyone in the past to discuss. Reports upper back pain related to it.    Patient presents to clinic with chronic knee pain and menopausal symptoms.     Musculoskeletal problem/pain      Duration: \"forever\", denies significant worsening of symptoms but states the symptoms haven't gotten any better.     Description  Location: upper and lower back, bilateral knees, patient notes cracking and popping with some movement     Intensity:  Mild/nmoderate, depending on activity level    Accompanying signs and symptoms: none, denies swelling    History  Previous similar problem: YES    Previous evaluation:  x-ray 09/20/2017 - showed moderate degenerative changes.     Precipitating or alleviating factors:  Trauma or overuse: no   Aggravating factors include: standing, walking, climbing stairs, lifting, exercise and overuse  Therapies tried and outcome: patient has been taking a collagen protein since late summer and this has helped with the pain. Advil \"often\", heating pad, hot tub.     Patient was recommended to have cortisone injection " on bilateral knee pain in 2017, but her  lost his job and she did not have health insurance. She would like to pursue this at this time.    Patient reports menopausal symptoms over the past 5-6 months, with frequent hot flashes that occur many times a day, occurring worse at night, inhibiting sleep. Reports 4-5 hrs of inturrupted sleep for the past several weeks. Reports excessive fatigue/exhaustion with lack of sleep. Patient had prevented hot flashes with Amberin (OTC supplement) but feels this is no longer working to manage symptoms. Patient reports monthly periods, LMP 2/8/20. However, her periods are lasting longer than normal, approximately 8-14 days, with moderate flow. Denies pelvic pain, cramping, or spotting. Patient reports slight weight increase with other perimenopausal symptoms. Patient denies birth control use. Recent biopsy (8/2019) and mammogram (2/2020) negative with low suspicion for malignancy. Patient reported anxiety with recent mammogram/biopsy. No family or personal history of breast cancer or clotting disorders. Not a smoker. Patient also is wondering about breast reduction surgery and if her insurance would cover that.     Patient also notes intermittent muffled hearing that has been present for the past several months with possible associated dizziness that she notes when lying down at night. Denies ear pain/fullness. Reports feeling hydrated and denies dizziness at other times throughout the day.     Today's PHQ-2 Score:   PHQ-2 ( 1999 Pfizer) 3/5/2020   Q1: Little interest or pleasure in doing things 1   Q2: Feeling down, depressed or hopeless 1   PHQ-2 Score 2     Abuse: Current or Past(Physical, Sexual or Emotional)- No  Do you feel safe in your environment? Yes    Social History     Tobacco Use     Smoking status: Never Smoker     Smokeless tobacco: Never Used     Tobacco comment: none in home   Substance Use Topics     Alcohol use: Yes     Comment: 2 drinks a week     If you  drink alcohol do you typically have >3 drinks per day or >7 drinks per week? No    Alcohol Use 9/20/2017   Prescreen: >3 drinks/day or >7 drinks/week? The patient does not drink >3 drinks per day nor >7 drinks per week.   No flowsheet data found.    Reviewed orders with patient.  Reviewed health maintenance and updated orders accordingly - Yes  Lab work is in process  Labs reviewed in EPIC  BP Readings from Last 3 Encounters:   03/05/20 124/88   09/20/17 112/80   01/06/15 112/64    Wt Readings from Last 3 Encounters:   03/05/20 75.6 kg (166 lb 9.6 oz)   09/20/17 69.5 kg (153 lb 3.2 oz)   01/06/15 64.9 kg (143 lb)            Patient Active Problem List   Diagnosis     Dermatitis due to cosmetics     Hypoglycemia     Family history of polyps in the colon     Vitamin D deficiency     Pain in both knees, unspecified chronicity     Hyperlipidemia LDL goal <130     Menopausal syndrome (hot flashes)     Tiera-menopause     Past Surgical History:   Procedure Laterality Date     SURGICAL HISTORY OF -   10/16/1997    D&C       Social History     Tobacco Use     Smoking status: Never Smoker     Smokeless tobacco: Never Used     Tobacco comment: none in home   Substance Use Topics     Alcohol use: Yes     Comment: 2 drinks a week     Family History   Problem Relation Age of Onset     Arthritis Mother      Cerebrovascular Disease Mother      Cancer Father         SKIN     Heart Disease Father         heart surgery - ?valve     Gastrointestinal Disease Father         colon polyps     Lipids Sister      Alcohol/Drug Brother      Allergies Sister      Genitourinary Problems Sister         KIDNEY STONES     Obesity Sister      Obesity Sister      Cancer - colorectal Maternal Grandmother      Breast Cancer No family hx of          Current Outpatient Medications   Medication Sig Dispense Refill     CALTRATE 600 + D 600-200 MG-IU OR TABS 1 TABLET DAILY       Cholecalciferol (VITAMIN D3) 2000 UNITS TABS Take  by mouth. 1 tablet daily        Multiple Vitamins-Minerals (MULTIVITAMIN OR) Take  by mouth. 1 tablet daily       OMEGA-3 CAPS   OR 2 tablets daily  0     VITAMIN C 500 MG OR TABS 1 TABLET TWICE DAILY       Allergies   Allergen Reactions     Augmentin Rash     Cephalosporins Hives     Penicillins Hives     systemic     Sulfa Drugs Rash     Recent Labs   Lab Test 17  0814 01/06/15  0908 13  0750   * 119 102   HDL 67 70 69   TRIG 226* 153* 109   TSH  --   --  2.09     Mammogram Screening: Patient over age 50, mutual decision to screen reflected in health maintenance.    Pertinent mammograms are reviewed under the imaging tab.  History of abnormal Pap smear:   NO - age 30- 65 PAP every 3 years recommended  Last 3 Pap Results:   PAP (no units)   Date Value   2015 NIL   2011 NIL   2008 NIL     PAP / HPV 2015   PAP NIL NIL NIL     Reviewed and updated as needed this visit by clinical staff  Reviewed and updated as needed this visit by Provider    Past Medical History:   Diagnosis Date     Cellulitis and abscess of unspecified site -2005      Past Surgical History:   Procedure Laterality Date     SURGICAL HISTORY OF -   10/16/1997    D&C     OB History    Para Term  AB Living   3 2 2 0 1 2   SAB TAB Ectopic Multiple Live Births   1 0 0 0 0      # Outcome Date GA Lbr Moy/2nd Weight Sex Delivery Anes PTL Lv   3 Term            2 Term            1 SAB                Review of Systems  INTEGUMENTARU/SKIN: NEGATIVE for worrisome rashes, moles or lesions  EYES: NEGATIVE for vision changes or irritation  ENT: NEGATIVE for mouth and throat problems  ENT: POSITIVE for intermittent hearing loss/muffling sounds, states possible ringing in the ears with possible associated dizziness when lying down, denies pain, injury to the ear.  RESP: NEGATIVE for significant cough or SOB  BREAST: NEGATIVE for masses, tenderness or discharge  CV: NEGATIVE for chest pain, palpitations or peripheral  "edema  GI: NEGATIVE for nausea, abdominal pain, heartburn, or change in bowel habits  : NEGATIVE for vaginal/pelvic symptoms. Negative for urinary burning or pain. Periods are monthly with 8-14 day duration (longer than her typical pre-menopausal periods)  MUSCULOSKELETAL: Positive for bilateral knee pain that worsens with exercise, NEGATIVE for other significant arthralgias or myalgia  NEURO: NEGATIVE for weakness, dizziness or paresthesias  PSYCHIATRIC: NEGATIVE for changes in mood or affect     OBJECTIVE:   /88 (BP Location: Left arm, Patient Position: Sitting, Cuff Size: Adult Regular)   Pulse 72   Temp 97.1  F (36.2  C) (Tympanic)   Resp 12   Ht 1.676 m (5' 6\")   Wt 75.6 kg (166 lb 9.6 oz)   SpO2 99%   BMI 26.89 kg/m    Physical Exam  GENERAL: healthy, alert and no distress  EYES: Eyes grossly normal to inspection, PERRL and conjunctivae and sclerae normal  HENT: ear canals and TM's normal, nose and mouth without ulcers or lesions  HENT: normal cephalic/atraumatic, both ears: mucopurulent effusion noted (R>L) without erythema, no canal irritation or perforation noted, nose and mouth without ulcers or lesions, oropharynx clear and oral mucous membranes moist  NECK: no adenopathy, no asymmetry, masses, or scars and thyroid normal to palpation  RESP: lungs clear to auscultation - no rales, rhonchi or wheezes  CV: regular rate and rhythm, normal S1 S2, no S3 or S4, no murmur, click or rub, no peripheral edema and peripheral pulses strong  ABDOMEN: soft, nontender, no hepatosplenomegaly, no masses and bowel sounds normal   (female): normal female external genitalia, normal urethral meatus, vaginal mucosa pink, moist, well rugated, and normal cervix/adnexa/uterus without masses or abnormal discharge  MS: no gross musculoskeletal defects noted, no edema  SKIN: no suspicious lesions or rashes  NEURO: Normal strength and tone, mentation intact and speech normal  PSYCH: mentation appears normal, affect " normal/bright    Diagnostic Test Results:  Labs reviewed in Epic  Results for orders placed or performed in visit on 03/05/20 (from the past 24 hour(s))   Lipid panel reflex to direct LDL Fasting   Result Value Ref Range    Cholesterol 312 (H) <200 mg/dL    Triglycerides 269 (H) <150 mg/dL    HDL Cholesterol 62 >49 mg/dL    LDL Cholesterol Calculated 196 (H) <100 mg/dL    Non HDL Cholesterol 250 (H) <130 mg/dL   Glucose   Result Value Ref Range    Glucose 90 70 - 99 mg/dL   CBC with platelets   Result Value Ref Range    WBC 4.7 4.0 - 11.0 10e9/L    RBC Count 4.33 3.8 - 5.2 10e12/L    Hemoglobin 14.0 11.7 - 15.7 g/dL    Hematocrit 41.2 35.0 - 47.0 %    MCV 95 78 - 100 fl    MCH 32.3 26.5 - 33.0 pg    MCHC 34.0 31.5 - 36.5 g/dL    RDW 12.4 10.0 - 15.0 %    Platelet Count 219 150 - 450 10e9/L   Iron and iron binding capacity   Result Value Ref Range    Iron 143 35 - 180 ug/dL    Iron Binding Cap 330 240 - 430 ug/dL    Iron Saturation Index 43 15 - 46 %     ASSESSMENT/PLAN:   (Z00.00) Routine general medical examination at a health care facility  (primary encounter diagnosis)  Comment: Routine screening related to age.  Plan: Glucose        - Will call regarding results.    (N95.1) Mariama-menopause  Comment: Discussed her mariama-menopausal status at length, with regard to hot flashes affecting her sleep. Discussed several options for hormone replacement therapy including low dose OCs, individual low dose estrogen/progesterone, or serotonin specific reuptake inhibitor use. Patient decided to start with low dose OCs, but is concerned about symptoms (mainly weight gain). Discussed using an OTC sleep aid or melatonin, patient reports she has tried Tylenol PM or another OTC sleep aid but is concerned she will still wake up with the hot flashes but be even more groggy in the morning.   Plan:    - levonorgestrel-ethinyl estradiol (AVIANE)         0.1-20 MG-MCG tablet, OFFICE/OUTPT         VISIT,EST,LEVL III   - Motitor adverse  effects of birth control and period duration/flow. Monitor hot flash frequency and severity.    - Discussed reasons to return to clinic or seek emergency services.     (N95.1) Menopausal syndrome (hot flashes)  Comment: *see above  Plan: levonorgestrel-ethinyl estradiol (AVIANE)         0.1-20 MG-MCG tablet, OFFICE/OUTPT         VISIT,EST,LEVL III    (E78.5) Hyperlipidemia LDL goal <130  Comment: Previously elevated lipid levels. Recheck today.   Plan:    - Lipid panel reflex to direct LDL Fasting,         OFFICE/OUTPT VISIT,EST,LEVL III   - Will call patient regarding results.     (M25.561,  M25.562) Pain in both knees, unspecified chronicity  Comment: Discussed possible corticosteroid injection for bilateral knee pain. Patient open to referral to sports medicine to discuss this further.   Plan:    - Orthopedic & Spine  Referral,         OFFICE/OUTPT VISIT,EST,LEVL III    (Z12.11) Special screening for malignant neoplasms, colon  Comment: Age-relate screening.  Plan:    - Fecal colorectal cancer screen FIT,         OFFICE/OUTPT VISIT,EST,LEVL III    (Z12.4) Encounter for screening for cervical cancer   Comment: No abnormal PAPs since 2008.  Plan:    - Pap imaged thin layer screen with HPV -         recommended age 30 - 65, HPV High Risk Types         DNA Cervical, OFFICE/OUTPT VISIT,EST,LEVL III   - Will call with results.     (N92.0) Menorrhagia with regular cycle  Comment: Longer periods, with significant flow and dizziness symptoms. Rule out Anemia due to blood loss.  Plan:    - CBC with platelets, Iron and iron binding         capacity, OFFICE/OUTPT VISIT,EST,LEVL III   - Will call patient regarding results    (H69.83) Dysfunction of both eustachian tubes  Comment: Bilateral effusions noted with intermittent muffled hearing  Plan:    - fluticasone (FLONASE) 50 MCG/ACT nasal spray,    OFFICE/OUTPT VISIT,EST,LEVL III   - Discussed reasons to return to clinic    (N63.0) Large mass of breast  Comment:  "Patient is curious about breast reduction related to back pain, would like more information.  Plan:    - PLASTIC SURGERY REFERRAL, OFFICE/OUTPT         VISIT,ESTLEVL III        (Z23) Need for prophylactic vaccination and inoculation against influenza  Comment: Prophylactic influenza.  Plan:    - INFLUENZA QUAD, RECOMBINANT, P-FREE (RIV4)         (FLUBLOCK) [44968], Vaccine Administration,         Initial [17116]      COUNSELING:  Reviewed preventive health counseling, as reflected in patient instructions       Healthy diet/nutrition       Colon cancer screening       (Tiera)menopause management    Estimated body mass index is 26.89 kg/m  as calculated from the following:    Height as of this encounter: 1.676 m (5' 6\").    Weight as of this encounter: 75.6 kg (166 lb 9.6 oz).    Weight management plan: Discussed healthy diet and exercise guidelines     reports that she has never smoked. She has never used smokeless tobacco.      Counseling Resources:  ATP IV Guidelines  Pooled Cohorts Equation Calculator  Breast Cancer Risk Calculator  FRAX Risk Assessment  ICSI Preventive Guidelines  Dietary Guidelines for Americans, 2010  USDA's MyPlate  ASA Prophylaxis  Lung CA Screening    Leola Reyna NP  [facility]  "

## 2020-03-06 DIAGNOSIS — Z12.11 SPECIAL SCREENING FOR MALIGNANT NEOPLASMS, COLON: ICD-10-CM

## 2020-03-06 PROCEDURE — 82274 ASSAY TEST FOR BLOOD FECAL: CPT | Performed by: NURSE PRACTITIONER

## 2020-03-06 NOTE — RESULT ENCOUNTER NOTE
All of your lab results are normal except your cholesterol is moderately elevated.  I would work on lowering cholesterol in diet and see below for other recommendations.    There are a number of functional foods and dietary supplements that are promoted for cholesterol lowering. Among the most effective products are plant stanols or sterols (Benecol and others) soy proteins, and policosanol. Other products that provide some benefit include: psyllium (Metamucil), oats, and niacin (avoid over-the-counter, long-acting preparations). While other agents may be appropriate for some patients, most patients should avoid garlic due to drug interactions, and red yeast due to product quality issues.      The 10-year ASCVD risk score (Enterprisetriny GUAN Jr., et al., 2013) is: 2.1%    Values used to calculate the score:      Age: 54 years      Sex: Female      Is Non- : No      Diabetic: No      Tobacco smoker: No      Systolic Blood Pressure: 124 mmHg      Is BP treated: No      HDL Cholesterol: 62 mg/dL      Total Cholesterol: 312 mg/dL    Please notify patient of results.  HUYEN Muller

## 2020-03-09 LAB — HEMOCCULT STL QL IA: NEGATIVE

## 2020-03-10 LAB
COPATH REPORT: NORMAL
PAP: NORMAL

## 2020-03-12 LAB
FINAL DIAGNOSIS: NORMAL
HPV HR 12 DNA CVX QL NAA+PROBE: NEGATIVE
HPV16 DNA SPEC QL NAA+PROBE: NEGATIVE
HPV18 DNA SPEC QL NAA+PROBE: NEGATIVE
SPECIMEN DESCRIPTION: NORMAL
SPECIMEN SOURCE CVX/VAG CYTO: NORMAL

## 2020-04-17 ENCOUNTER — MYC MEDICAL ADVICE (OUTPATIENT)
Dept: FAMILY MEDICINE | Facility: CLINIC | Age: 55
End: 2020-04-17

## 2020-04-22 ENCOUNTER — TELEPHONE (OUTPATIENT)
Dept: FAMILY MEDICINE | Facility: CLINIC | Age: 55
End: 2020-04-22

## 2020-04-22 ENCOUNTER — VIRTUAL VISIT (OUTPATIENT)
Dept: FAMILY MEDICINE | Facility: CLINIC | Age: 55
End: 2020-04-22
Payer: COMMERCIAL

## 2020-04-22 DIAGNOSIS — J30.2 SEASONAL ALLERGIC RHINITIS, UNSPECIFIED TRIGGER: ICD-10-CM

## 2020-04-22 DIAGNOSIS — N95.1 MENOPAUSAL SYNDROME (HOT FLASHES): ICD-10-CM

## 2020-04-22 DIAGNOSIS — K64.4 EXTERNAL HEMORRHOIDS: ICD-10-CM

## 2020-04-22 DIAGNOSIS — N95.1 PERI-MENOPAUSE: ICD-10-CM

## 2020-04-22 DIAGNOSIS — K59.00 CONSTIPATION, UNSPECIFIED CONSTIPATION TYPE: Primary | ICD-10-CM

## 2020-04-22 DIAGNOSIS — K62.5 RECTAL BLEEDING: ICD-10-CM

## 2020-04-22 DIAGNOSIS — Z12.11 SPECIAL SCREENING FOR MALIGNANT NEOPLASMS, COLON: ICD-10-CM

## 2020-04-22 PROCEDURE — 99214 OFFICE O/P EST MOD 30 MIN: CPT | Mod: 95 | Performed by: NURSE PRACTITIONER

## 2020-04-22 RX ORDER — BENZOCAINE/MENTHOL 6 MG-10 MG
LOZENGE MUCOUS MEMBRANE 2 TIMES DAILY PRN
Qty: 30 G | Refills: 1 | Status: SHIPPED | OUTPATIENT
Start: 2020-04-22

## 2020-04-22 RX ORDER — HYDROCORTISONE ACETATE 25 MG/1
25 SUPPOSITORY RECTAL 2 TIMES DAILY PRN
Qty: 12 SUPPOSITORY | Refills: 1 | Status: SHIPPED | OUTPATIENT
Start: 2020-04-22 | End: 2021-09-23

## 2020-04-22 RX ORDER — LEVONORGESTREL/ETHIN.ESTRADIOL 0.1-0.02MG
1 TABLET ORAL DAILY
Qty: 84 TABLET | Refills: 3 | Status: SHIPPED | OUTPATIENT
Start: 2020-04-22 | End: 2020-06-04

## 2020-04-22 RX ORDER — POLYETHYLENE GLYCOL 3350 17 G/17G
1 POWDER, FOR SOLUTION ORAL DAILY
Qty: 578 G | Refills: 1 | Status: SHIPPED | OUTPATIENT
Start: 2020-04-22 | End: 2020-06-25

## 2020-04-22 RX ORDER — AZELASTINE 1 MG/ML
1 SPRAY, METERED NASAL 2 TIMES DAILY PRN
Qty: 30 ML | Refills: 3 | Status: SHIPPED | OUTPATIENT
Start: 2020-04-22 | End: 2021-09-23

## 2020-04-22 NOTE — TELEPHONE ENCOUNTER
Contacted patient who requested prescription for Aviane be sent to [facility]. Ignacia Dhaliwal on 4/22/2020 at 10:57 AM

## 2020-04-22 NOTE — PROGRESS NOTES
"Pearl Gibson is a 54 year old female who is being evaluated via a billable telephone visit.      The patient has been notified of following:     \"This telephone visit will be conducted via a call between you and your physician/provider. We have found that certain health care needs can be provided without the need for a physical exam.  This service lets us provide the care you need with a short phone conversation.  If a prescription is necessary we can send it directly to your pharmacy.  If lab work is needed we can place an order for that and you can then stop by our lab to have the test done at a later time.    Telephone visits are billed at different rates depending on your insurance coverage. During this emergency period, for some insurers they may be billed the same as an in-person visit.  Please reach out to your insurance provider with any questions.    If during the course of the call the physician/provider feels a telephone visit is not appropriate, you will not be charged for this service.\"    Patient has given verbal consent for Telephone visit?  Yes    How would you like to obtain your AVS? Sammyhart    Subjective     Pearl Gibson is a 54 year old female who presents to clinic today for the following health issues:    HPI  Hemorrhoids - External -come and go for many years   Blood in Stool   Onset: 3 weeks ago   This past Friday was straining and noticed blood in stool again . Every other day now has noticed it . ( when she has bigger bowel movements - when straining, blood is bright red )     Description:   Pain: no   Itching: no     Accompanying Signs & Symptoms:  Blood streaked toilet paper: YES  Blood in stool: YES  Changes in stool pattern: YES - was constipated     History:   Any previous GI studies done:none  Family History of colon cancer: YES- Maternal Grandmother     Precipitating factors:   Straining     Alleviating factors:  None    Therapies Tried and outcome: increased fiber in " diet and increased fluids, Metamucil - started this over the weekend - has not noticed much yet     History of hemorrhoids in the past - worse with constipation and with straining.  Eating more carbohydrates and pasta which contributes to constipation.  Last BM was Sat but small and hard.  Sunday was larger BM and had blood with the BM.  Monday took stool softener and Metamucil - had 2 BM's yesterday - had bleeding twice yesterday.  Reports irritation in rectal area after.  No itching or pain.        Patient Active Problem List   Diagnosis     Dermatitis due to cosmetics     Hypoglycemia     Family history of polyps in the colon     Vitamin D deficiency     Pain in both knees, unspecified chronicity     Hyperlipidemia LDL goal <130     Menopausal syndrome (hot flashes)     Tiera-menopause     Past Surgical History:   Procedure Laterality Date     SURGICAL HISTORY OF -   10/16/1997    D&C       Social History     Tobacco Use     Smoking status: Never Smoker     Smokeless tobacco: Never Used     Tobacco comment: none in home   Substance Use Topics     Alcohol use: Yes     Comment: 2 drinks a week     Family History   Problem Relation Age of Onset     Arthritis Mother      Cerebrovascular Disease Mother      Cancer Father         SKIN     Heart Disease Father         heart surgery - ?valve     Gastrointestinal Disease Father         colon polyps     Lipids Sister      Alcohol/Drug Brother      Allergies Sister      Genitourinary Problems Sister         KIDNEY STONES     Obesity Sister      Obesity Sister      Cancer - colorectal Maternal Grandmother      Breast Cancer No family hx of          Current Outpatient Medications   Medication Sig Dispense Refill     azelastine (ASTELIN) 0.1 % nasal spray Spray 1 spray into both nostrils 2 times daily as needed for rhinitis or allergies 30 mL 3     CALTRATE 600 + D 600-200 MG-IU OR TABS 1 TABLET DAILY       Cholecalciferol (VITAMIN D3) 2000 UNITS TABS Take  by mouth. 1 tablet  daily       hydrocortisone (ANUSOL-HC) 25 MG suppository Place 1 suppository (25 mg) rectally 2 times daily as needed for hemorrhoids (rectal pain, itching, internal hemorrhoids) 12 suppository 1     hydrocortisone (CORTAID) 1 % external cream Apply topically 2 times daily as needed 30 g 1     levonorgestrel-ethinyl estradiol (AVIANE) 0.1-20 MG-MCG tablet Take 1 tablet by mouth daily 84 tablet 3     Multiple Vitamins-Minerals (MULTIVITAMIN OR) Take  by mouth. 1 tablet daily       polyethylene glycol (MIRALAX) 17 GM/SCOOP powder Take 17 g (1 capful) by mouth daily 578 g 1     VITAMIN C 500 MG OR TABS 1 TABLET TWICE DAILY       OMEGA-3 CAPS   OR 2 tablets daily  0     Allergies   Allergen Reactions     Augmentin Rash     Cephalosporins Hives     Penicillins Hives     systemic     Sulfa Drugs Rash     Recent Labs   Lab Test 03/05/20  1019 09/20/17  0814 01/06/15  0908 05/07/13  0750   * 152* 119 102   HDL 62 67 70 69   TRIG 269* 226* 153* 109   TSH  --   --   --  2.09      BP Readings from Last 3 Encounters:   03/05/20 124/88   09/20/17 112/80   01/06/15 112/64    Wt Readings from Last 3 Encounters:   03/05/20 75.6 kg (166 lb 9.6 oz)   09/20/17 69.5 kg (153 lb 3.2 oz)   01/06/15 64.9 kg (143 lb)                    Reviewed and updated as needed this visit by Provider  Tobacco  Allergies  Meds  Problems  Med Hx  Surg Hx  Fam Hx         Review of Systems   ROS COMP: Constitutional, HEENT, cardiovascular, pulmonary, GI, , musculoskeletal, neuro, skin, endocrine and psych systems are negative, except as otherwise noted.  POS for nasal congestion - getting headaches from Flonase - would like alternative.  History of seasonal allergies - sinus symptoms spring/fall.  Taking oral contraceptive pills for hot flashes and working well would like to take continuously        Objective   Reported vitals:  There were no vitals taken for this visit.   healthy, alert and no distress  PSYCH: Alert and oriented times 3;  coherent speech, normal   rate and volume, able to articulate logical thoughts, able   to abstract reason, no tangential thoughts, no hallucinations   or delusions  Her affect is normal and pleasant  RESP: No cough, no audible wheezing, able to talk in full sentences  Remainder of exam unable to be completed due to telephone visits    Diagnostic Test Results:  Labs reviewed in Epic        Assessment/Plan:  1. Constipation, unspecified constipation type  Discussed prevention and treatment.  Most likely contributing to rectal bleeding and hemorrhoids    - polyethylene glycol (MIRALAX) 17 GM/SCOOP powder; Take 17 g (1 capful) by mouth daily  Dispense: 578 g; Refill: 1  - hydrocortisone (ANUSOL-HC) 25 MG suppository; Place 1 suppository (25 mg) rectally 2 times daily as needed for hemorrhoids (rectal pain, itching, internal hemorrhoids)  Dispense: 12 suppository; Refill: 1  - hydrocortisone (CORTAID) 1 % external cream; Apply topically 2 times daily as needed  Dispense: 30 g; Refill: 1    2. Rectal bleeding  Most likely caused by hemorrhoids - only occurring after BM for the past several days.  Due for colonoscopy regardless so ordered this today - can do POST COVID.    - GASTROENTEROLOGY ADULT REF PROCEDURE ONLY    Hemorrhoids  Given preventative information and treatment options - see AVS    3. Special screening for malignant neoplasms, colon     - GASTROENTEROLOGY ADULT REF PROCEDURE ONLY    4. Seasonal allergic rhinitis, unspecified trigger   Switched from Flonase - having headaches.    - azelastine (ASTELIN) 0.1 % nasal spray; Spray 1 spray into both nostrils 2 times daily as needed for rhinitis or allergies  Dispense: 30 mL; Refill: 3    5. Tiera-menopause  Take continuously - working well for menopausal symptoms.  No side effects.    - levonorgestrel-ethinyl estradiol (AVIANE) 0.1-20 MG-MCG tablet; Take 1 tablet by mouth daily  Dispense: 84 tablet; Refill: 3    6. Menopausal syndrome (hot flashes)     -  levonorgestrel-ethinyl estradiol (AVIANE) 0.1-20 MG-MCG tablet; Take 1 tablet by mouth daily  Dispense: 84 tablet; Refill: 3    Return if symptoms worsen or fail to improve.      Phone call duration:  21 minutes    Leola Reyna NP

## 2020-04-22 NOTE — PATIENT INSTRUCTIONS
For constipation:   1. Consume at least 8 glasses of water per day   2. Exercise for at least 30 minutes every day. Regular exercise helps to keep your digestive system active and and healthy and may help with constipation.   3. Increase dietary fiber. Goal of 25 grams per day for women, 35 grams per day for men. If unable to consume 25-35 grams through diet alone consider OTC supplements such as Benefiber, FiberCon, Metamucil, or Citrucel.   4. Recommend taking Miralax (17 grams = 1 scoop). Take once daily, can mix with anything. If you experience increasing bowel movements and diarrhea, decrease to every other day or every 3rd day. Miralax is an osmotic laxative that increases the amount of water secreted by the intestines resulting in softer and easier to pass stools.   5. Also recommend stimulant laxative such as Senna, Ex Lax or Dulcolax. Stimulant laxatives speed up the colonic motility of your gut helping to induce a bowel movement. Take as needed at bedtime.   6. If you are still having difficulties with constipation may also add a stool softener to your bowel regimen such a Docusate.     HUYEN Muller      Patient Education     Treating Hemorrhoids: Self-Care    Follow your healthcare provider s advice about caring for your hemorrhoids at home. Some treatments help relieve symptoms right away. Others involve making changes in your diet and exercise habits. These can help ease constipation and prevent hemorrhoid symptoms from coming back.  Relieving symptoms  Your healthcare provider may prescribe anti-inflammatory medicine to help ease your symptoms. The following tips will also help relieve pain and swelling.    Take sitz baths. Taking a sitz bath means sitting in a few inches of warm bath water. Soaking for 10 minutes twice a day can provide welcome relief from painful hemorrhoids. It can also help the area stay clean.    Develop good bowel habits. Use the bathroom when you need to. Don t ignore  the urge to move your bowels. This can lead to constipation, hard stools, and straining. Also, don t read while on the toilet. Sit only as long as needed. Wipe gently with soft, unscented toilet tissue or baby wipes.    Use ice packs. Placing an ice pack on a thrombosed external hemorrhoid can help relieve pain right away. It will also help reduce the blood clot. Use the ice for 15 to 20 minutes at a time. Keep a cloth between the ice and your skin to prevent skin damage.    Use other measures. Laxatives and enemas can help ease constipation. But use them only on your healthcare provider s advice. For symptom relief, try using cotton pads soaked in witch hazel. These are available at most drugstores. Over-the-counter hemorrhoid ointments and petroleum jelly can also provide relief.  Add fiber to your diet  Adding fiber to your diet can help relieve constipation by making stools softer and easier to pass. To increase your fiber intake, your healthcare provider may recommend a bulking agent, such as psyllium. This is a high-fiber supplement available at most grocery stores and drugstores. Eating more fiber-rich foods will also help. There are two types of fiber:    Insoluble fiber is the main ingredient in bulking agents. It s also found in foods such as wheat bran, whole-grain breads, fresh fruits, and vegetables.    Soluble fiber is found in foods such as oat bran. Although soluble fiber is good for you, it may not ease constipation as much as foods high in insoluble fiber.  Drink more water  Along with a high-fiber diet, drinking more water can help ease constipation. This is because insoluble fiber absorbs water, making stools soft and bulky. Be sure to drink plenty of water throughout the day. Drinking fruit juices, such as prune juice or apple juice, can also help prevent constipation.  Get more exercise  Regular exercise aids digestion and helps prevent constipation. It s also great for your health. So talk  with your healthcare provider about starting an exercise program. Low-impact activities, such as swimming or walking, are good places to start. Take it easy at first. And remember to drink plenty of water when you exercise.  High-fiber foods  High-fiber foods offer many benefits. By making your stools softer, they help heal and prevent swollen hemorrhoids. They may also help reduce the risk of colon and rectal cancer. Best of all, they re usually low in calories and taste great. Here are some examples of fiber-rich foods.    Whole grains, such as wheat bran, corn bran, and brown rice.    Vegetables, especially carrots, broccoli, cabbage, and peas.    Fruits, such as apples, bananas, raisins, peaches, and pears.    Nuts and legumes, especially peanuts, lentils, and kidney beans.  Easy ways to add fiber  The tips below offer some simple ways to add more high-fiber foods to your meals.    Start your day with a high-fiber breakfast. Eat a wheat bran cereal along with a sliced banana. Or, try peanut butter on whole-wheat toast.    Eat carrot sticks for snacks. They re easy to prepare, taste great, and are low in calories.    Use whole-grain breads instead of white bread for sandwiches.    Eat fruits for treats. Try an apple and some raisins instead of a candy bar.   Date Last Reviewed: 7/1/2016 2000-2019 [employer]. [address] [place] [address], [place] [address]. All rights reserved. This information is not intended as a substitute for professional medical care. Always follow your healthcare professional's instructions.

## 2020-04-29 ENCOUNTER — MYC MEDICAL ADVICE (OUTPATIENT)
Dept: FAMILY MEDICINE | Facility: CLINIC | Age: 55
End: 2020-04-29

## 2020-04-29 NOTE — TELEPHONE ENCOUNTER
Please tell patient that she can continue colace stool softeners and miralax but needs to be careful not to cause loose stools since this can also irritate hemorrhoids.  Miralax is the safest laxative to use and for some people is used daily for constipation issues.  Recommend using Preparation H only if hemorrhoid symptoms recur.  If any recurrence and unable to control symptoms, recommend follow-up in clinic.  Nisha Lagunas NP on 4/29/2020 at 2:51 PM

## 2020-06-04 ENCOUNTER — OFFICE VISIT (OUTPATIENT)
Dept: FAMILY MEDICINE | Facility: CLINIC | Age: 55
End: 2020-06-04
Payer: COMMERCIAL

## 2020-06-04 VITALS
DIASTOLIC BLOOD PRESSURE: 79 MMHG | RESPIRATION RATE: 18 BRPM | SYSTOLIC BLOOD PRESSURE: 131 MMHG | HEIGHT: 66 IN | HEART RATE: 83 BPM | BODY MASS INDEX: 26.89 KG/M2 | TEMPERATURE: 98.7 F | OXYGEN SATURATION: 99 %

## 2020-06-04 DIAGNOSIS — L30.9 DERMATITIS OF FACE: Primary | ICD-10-CM

## 2020-06-04 DIAGNOSIS — J34.89 NASAL CONGESTION WITH RHINORRHEA: ICD-10-CM

## 2020-06-04 DIAGNOSIS — H10.45 CHRONIC ALLERGIC CONJUNCTIVITIS: ICD-10-CM

## 2020-06-04 DIAGNOSIS — R09.81 NASAL CONGESTION WITH RHINORRHEA: ICD-10-CM

## 2020-06-04 DIAGNOSIS — N95.1 MENOPAUSAL SYNDROME (HOT FLASHES): ICD-10-CM

## 2020-06-04 DIAGNOSIS — N95.1 PERI-MENOPAUSE: ICD-10-CM

## 2020-06-04 PROCEDURE — 99214 OFFICE O/P EST MOD 30 MIN: CPT | Performed by: NURSE PRACTITIONER

## 2020-06-04 RX ORDER — OLOPATADINE HYDROCHLORIDE 665 UG/1
2 SPRAY NASAL 2 TIMES DAILY
Qty: 30.5 G | Refills: 1 | Status: SHIPPED | OUTPATIENT
Start: 2020-06-04 | End: 2021-09-23

## 2020-06-04 RX ORDER — METRONIDAZOLE 7.5 MG/G
GEL TOPICAL 2 TIMES DAILY
Qty: 45 G | Refills: 1 | Status: SHIPPED | OUTPATIENT
Start: 2020-06-04 | End: 2021-09-23

## 2020-06-04 RX ORDER — TETRACYCLINE HYDROCHLORIDE 500 MG/1
500 CAPSULE ORAL 2 TIMES DAILY
Qty: 20 CAPSULE | Refills: 0 | Status: SHIPPED | OUTPATIENT
Start: 2020-06-04 | End: 2020-06-14

## 2020-06-04 RX ORDER — LEVONORGESTREL/ETHIN.ESTRADIOL 0.1-0.02MG
1 TABLET ORAL DAILY
Qty: 84 TABLET | Refills: 3 | Status: SHIPPED | OUTPATIENT
Start: 2020-06-04 | End: 2021-06-07

## 2020-06-04 NOTE — PROGRESS NOTES
Subjective     Pearl Gibson is a 54 year old female who presents to clinic today for the following health issues:    HPI   Chief Complaint   Patient presents with     Derm Problem     Started 4/12/20 and has tried cortisone 10, tazorac (form of vitamin K - dermatology sample) cleared up  but came back; metrogel, finecia without relief; started bc on 3/15/20 for hot flashes - d/c for 17 days - rash didn't improve, so patient restarted bc yesterday.         Rash      Duration: since 4/12/2020    Description  Location: face, chin  Itching: mild-moderate    Intensity:  Gets inflamed then calms down    Accompanying signs and symptoms: redness, puffy, little pimples    History (similar episodes/previous evaluation): years ago something similar when patient went off bc.    Precipitating or alleviating factors:  New exposures:  None  Recent travel: no      Therapies tried and outcome: see above.    Used Tazorac which worsened symptoms.  Metrogel - didn't seem to help.    Cortisone 10 - used for 5 days - without much improvement.    No new make up, or tooth heidi.    No new cleanser.    Hx of seasonal allergies - now year round.  ? Allergies to her dogs.    Take Zyrtec daily.  Was on Flonase but gave her headaches - Astelin had side effects  Daily ear ringing and nasal congestion    Reports daily headaches since Spring.  Take Ibuprofen at least once daily.    Patient Active Problem List   Diagnosis     Dermatitis due to cosmetics     Hypoglycemia     Family history of polyps in the colon     Vitamin D deficiency     Pain in both knees, unspecified chronicity     Hyperlipidemia LDL goal <130     Menopausal syndrome (hot flashes)     Tiera-menopause     Past Surgical History:   Procedure Laterality Date     SURGICAL HISTORY OF -   10/16/1997    D&C       Social History     Tobacco Use     Smoking status: Never Smoker     Smokeless tobacco: Never Used     Tobacco comment: none in home   Substance Use Topics     Alcohol  use: Yes     Comment: 2 drinks a week     Family History   Problem Relation Age of Onset     Arthritis Mother      Cerebrovascular Disease Mother      Cancer Father         SKIN     Heart Disease Father         heart surgery - ?valve     Gastrointestinal Disease Father         colon polyps     Lipids Sister      Alcohol/Drug Brother      Allergies Sister      Genitourinary Problems Sister         KIDNEY STONES     Obesity Sister      Obesity Sister      Cancer - colorectal Maternal Grandmother      Breast Cancer No family hx of          Current Outpatient Medications   Medication Sig Dispense Refill     CALTRATE 600 + D 600-200 MG-IU OR TABS 1 TABLET DAILY       Cholecalciferol (VITAMIN D3) 2000 UNITS TABS Take  by mouth. 1 tablet daily       levonorgestrel-ethinyl estradiol (AVIANE) 0.1-20 MG-MCG tablet Take 1 tablet by mouth daily 84 tablet 3     Multiple Vitamins-Minerals (MULTIVITAMIN OR) Take  by mouth. 1 tablet daily       VITAMIN C 500 MG OR TABS 1 TABLET TWICE DAILY       azelastine (ASTELIN) 0.1 % nasal spray Spray 1 spray into both nostrils 2 times daily as needed for rhinitis or allergies (Patient not taking: Reported on 6/4/2020) 30 mL 3     hydrocortisone (ANUSOL-HC) 25 MG suppository Place 1 suppository (25 mg) rectally 2 times daily as needed for hemorrhoids (rectal pain, itching, internal hemorrhoids) 12 suppository 1     hydrocortisone (CORTAID) 1 % external cream Apply topically 2 times daily as needed 30 g 1     OMEGA-3 CAPS   OR 2 tablets daily  0     polyethylene glycol (MIRALAX) 17 GM/SCOOP powder Take 17 g (1 capful) by mouth daily (Patient not taking: Reported on 6/4/2020) 578 g 1     Allergies   Allergen Reactions     Augmentin Rash     Cephalosporins Hives     Penicillins Hives     systemic     Sulfa Drugs Rash     Recent Labs   Lab Test 03/05/20  1019 09/20/17  0814 01/06/15  0908 05/07/13  0750   * 152* 119 102   HDL 62 67 70 69   TRIG 269* 226* 153* 109   TSH  --   --   --  2.09  "     BP Readings from Last 3 Encounters:   06/04/20 131/79   03/05/20 124/88   09/20/17 112/80    Wt Readings from Last 3 Encounters:   03/05/20 75.6 kg (166 lb 9.6 oz)   09/20/17 69.5 kg (153 lb 3.2 oz)   01/06/15 64.9 kg (143 lb)                   Reviewed and updated as needed this visit by Provider         Review of Systems   Constitutional, HEENT, cardiovascular, pulmonary, GI, , musculoskeletal, neuro, skin, endocrine and psych systems are negative, except as otherwise noted.      Objective    /79   Pulse 83   Temp 98.7  F (37.1  C) (Tympanic)   Resp 18   Ht 1.676 m (5' 6\")   SpO2 99%   BMI 26.89 kg/m    Body mass index is 26.89 kg/m .  Physical Exam   GENERAL: healthy, alert and no distress  RESP: lungs clear to auscultation - no rales, rhonchi or wheezes  CV: regular rate and rhythm, normal S1 S2, no S3 or S4, no murmur, click or rub, no peripheral edema and peripheral pulses strong  SKIN: raised pink maculopapular rash around mouth sparing vermilion border.  PSYCH: mentation appears normal, affect normal/bright    Diagnostic Test Results:  Labs reviewed in Epic        Assessment & Plan     1. Dermatitis of face     - metroNIDAZOLE (METROGEL) 0.75 % external gel; Apply topically 2 times daily  Dispense: 45 g; Refill: 1  - tetracycline (ACHROMYCIN/SUMYCIN) 500 MG capsule; Take 1 capsule (500 mg) by mouth 2 times daily for 10 days  Dispense: 20 capsule; Refill: 0    2. Nasal congestion with rhinorrhea     - ALLERGY/ASTHMA ADULT REFERRAL  - olopatadine (PATANASE) 0.6 % nasal spray; Spray 2 sprays into both nostrils 2 times daily  Dispense: 30.5 g; Refill: 1    3. Chronic allergic conjunctivitis     - ALLERGY/ASTHMA ADULT REFERRAL  - olopatadine (PATANASE) 0.6 % nasal spray; Spray 2 sprays into both nostrils 2 times daily  Dispense: 30.5 g; Refill: 1    4. Tiera-menopause     - levonorgestrel-ethinyl estradiol (AVIANE) 0.1-20 MG-MCG tablet; Take 1 tablet by mouth daily  Dispense: 84 tablet; Refill: " 3    5. Menopausal syndrome (hot flashes)     - levonorgestrel-ethinyl estradiol (AVIANE) 0.1-20 MG-MCG tablet; Take 1 tablet by mouth daily  Dispense: 84 tablet; Refill: 3     Total times spent with patient 25 minutes of which > 50% of the time was spent counseling and coordination of care discussion of above complaints, medications, treatment options, fu and recommendations.  See AVS.    Patient Instructions     Patient Education     Tetracycline tablets or capsules  Brand Names: Emtet-500, Panmycin, Sumycin  What is this medicine?  TETRACYCLINE (tet keira baron) is a tetracycline antibiotic. It is used to treat certain kinds of bacterial infections. It will not work for colds, flu, or other viral infections.  How should I use this medicine?  Take this medicine by mouth with a full glass of water. Follow the directions on the prescription label. Take this medicine on an empty stomach, at least 1 hour before or at least 2 hours after food. Do not take with food or dairy. Do not crush or chew. Take while in an upright or sitting position. Taking a sip of water first, before taking the medicine, may help you to swallow it. Take your bedtime doses at least 10 minutes before lying down.Take your medicine at regular intervals. Take all of your medicine as directed even if you think you are better. Do not skip doses or stop your medicine early.  Talk to your pediatrician regarding the use of this medicine in children. Special care may be needed.  What side effects may I notice from receiving this medicine?  Side effects that you should report to your doctor or health care professional as soon as possible:    allergic reactions like skin rash, itching or hives, swelling of the face, lips, or tongue    dark urine    difficulty breathing    fever    pain on swallowing    rectal or genital irritation    tooth discoloration    unusually weak or tired    white patches or sores in the mouth    yellowing eyes, skin  Side  effects that usually do not require medical attention (report to your doctor or health care professional if they continue or are bothersome):    black tongue    diarrhea    headache    loss of appetite    nausea, vomiting  What may interact with this medicine?    antacids    birth control pills    methoxyflurane    other antibiotics like penicillin    some multivitamins    warfarin    What if I miss a dose?  If you miss a dose, take it as soon as you can. If it is almost time for your next dose, take only that dose. Do not take double or extra doses.  Where should I keep my medicine?  Keep out of the reach of children.  Store at room temperature between 15 and 30 degrees C (59 and 86 degrees F). Protect from light. Throw away any unused medicine after the expiration date.  What should I tell my health care provider before I take this medicine?  They need to know if you have any of these conditions:    kidney disease    liver disease    an unusual or allergic reaction to tetracycline, other antibiotics or medicines, foods, dyes, or preservatives    pregnant or trying to get pregnant    breast-feeding  What should I watch for while using this medicine?  Tell your doctor or health care professional if your symptoms do not improve or if they get worse.  This medicine can make you more sensitive to the sun. Keep out of the sun. If you cannot avoid being in the sun, wear protective clothing and use sunscreen. Do not use sun lamps or tanning beds/booths.  Birth control pills may not work properly while you are taking this medicine. Talk to your doctor about using an extra method of birth control.  If you are being treated for a sexually transmitted disease, avoid sexual contact until you have finished your treatment. Your sexual partner may also need treatment.  Avoid products with aluminum, calcium, magnesium, iron, zinc, or sodium bicarbonate for 2 hours before and after taking a dose of this medicine.  NOTE:This sheet  is a summary. It may not cover all possible information. If you have questions about this medicine, talk to your doctor, pharmacist, or health care provider. Copyright  2019 [employer]               No follow-ups on file.    Leola Renya NP  [facility]

## 2020-06-04 NOTE — PATIENT INSTRUCTIONS
Patient Education     Tetracycline tablets or capsules  Brand Names: Emtet-500, Panmycin, Sumycin  What is this medicine?  TETRACYCLINE (tet keira MARCIA baron) is a tetracycline antibiotic. It is used to treat certain kinds of bacterial infections. It will not work for colds, flu, or other viral infections.  How should I use this medicine?  Take this medicine by mouth with a full glass of water. Follow the directions on the prescription label. Take this medicine on an empty stomach, at least 1 hour before or at least 2 hours after food. Do not take with food or dairy. Do not crush or chew. Take while in an upright or sitting position. Taking a sip of water first, before taking the medicine, may help you to swallow it. Take your bedtime doses at least 10 minutes before lying down.Take your medicine at regular intervals. Take all of your medicine as directed even if you think you are better. Do not skip doses or stop your medicine early.  Talk to your pediatrician regarding the use of this medicine in children. Special care may be needed.  What side effects may I notice from receiving this medicine?  Side effects that you should report to your doctor or health care professional as soon as possible:    allergic reactions like skin rash, itching or hives, swelling of the face, lips, or tongue    dark urine    difficulty breathing    fever    pain on swallowing    rectal or genital irritation    tooth discoloration    unusually weak or tired    white patches or sores in the mouth    yellowing eyes, skin  Side effects that usually do not require medical attention (report to your doctor or health care professional if they continue or are bothersome):    black tongue    diarrhea    headache    loss of appetite    nausea, vomiting  What may interact with this medicine?    antacids    birth control pills    methoxyflurane    other antibiotics like penicillin    some multivitamins    warfarin    What if I miss a dose?  If you  miss a dose, take it as soon as you can. If it is almost time for your next dose, take only that dose. Do not take double or extra doses.  Where should I keep my medicine?  Keep out of the reach of children.  Store at room temperature between 15 and 30 degrees C (59 and 86 degrees F). Protect from light. Throw away any unused medicine after the expiration date.  What should I tell my health care provider before I take this medicine?  They need to know if you have any of these conditions:    kidney disease    liver disease    an unusual or allergic reaction to tetracycline, other antibiotics or medicines, foods, dyes, or preservatives    pregnant or trying to get pregnant    breast-feeding  What should I watch for while using this medicine?  Tell your doctor or health care professional if your symptoms do not improve or if they get worse.  This medicine can make you more sensitive to the sun. Keep out of the sun. If you cannot avoid being in the sun, wear protective clothing and use sunscreen. Do not use sun lamps or tanning beds/booths.  Birth control pills may not work properly while you are taking this medicine. Talk to your doctor about using an extra method of birth control.  If you are being treated for a sexually transmitted disease, avoid sexual contact until you have finished your treatment. Your sexual partner may also need treatment.  Avoid products with aluminum, calcium, magnesium, iron, zinc, or sodium bicarbonate for 2 hours before and after taking a dose of this medicine.  NOTE:This sheet is a summary. It may not cover all possible information. If you have questions about this medicine, talk to your doctor, pharmacist, or health care provider. Copyright  2019 [employer]

## 2020-06-19 ENCOUNTER — MYC MEDICAL ADVICE (OUTPATIENT)
Dept: FAMILY MEDICINE | Facility: CLINIC | Age: 55
End: 2020-06-19

## 2020-06-19 DIAGNOSIS — R21 RASH AND NONSPECIFIC SKIN ERUPTION: Primary | ICD-10-CM

## 2020-06-22 NOTE — TELEPHONE ENCOUNTER
Referral to [facility] no improvement despite both topical and oral therapies.    They are doing video visits too.  HUYEN Muller

## 2020-06-24 DIAGNOSIS — K59.00 CONSTIPATION, UNSPECIFIED CONSTIPATION TYPE: ICD-10-CM

## 2020-06-25 RX ORDER — POLYETHYLENE GLYCOL 3350 17 G/17G
1 POWDER, FOR SOLUTION ORAL DAILY
Qty: 578 G | Refills: 9 | Status: SHIPPED | OUTPATIENT
Start: 2020-06-25

## 2020-09-04 ENCOUNTER — HOSPITAL ENCOUNTER (OUTPATIENT)
Dept: MAMMOGRAPHY | Facility: CLINIC | Age: 55
Discharge: HOME | End: 2020-09-04
Attending: NURSE PRACTITIONER | Admitting: NURSE PRACTITIONER
Payer: COMMERCIAL

## 2020-09-04 DIAGNOSIS — Z12.31 OTHER SCREENING MAMMOGRAM: ICD-10-CM

## 2020-09-04 PROCEDURE — 77067 SCR MAMMO BI INCL CAD: CPT

## 2020-09-30 ENCOUNTER — IMMUNIZATION (OUTPATIENT)
Dept: FAMILY MEDICINE | Facility: CLINIC | Age: 55
End: 2020-09-30
Payer: COMMERCIAL

## 2020-09-30 PROCEDURE — 90682 RIV4 VACC RECOMBINANT DNA IM: CPT

## 2020-09-30 PROCEDURE — 90471 IMMUNIZATION ADMIN: CPT

## 2021-04-12 ENCOUNTER — MYC MEDICAL ADVICE (OUTPATIENT)
Dept: FAMILY MEDICINE | Facility: CLINIC | Age: 56
End: 2021-04-12

## 2021-05-29 ENCOUNTER — HEALTH MAINTENANCE LETTER (OUTPATIENT)
Age: 56
End: 2021-05-29

## 2021-06-07 DIAGNOSIS — N95.1 MENOPAUSAL SYNDROME (HOT FLASHES): ICD-10-CM

## 2021-06-07 DIAGNOSIS — N95.1 PERI-MENOPAUSE: ICD-10-CM

## 2021-06-07 NOTE — TELEPHONE ENCOUNTER
Routing refill request to provider for review/approval because:  Protocol only allows for a one month supply when pt has not been seen in over a year (last OV 6/4/2020)  Pt does not have an upcoming appt scheduled    Maliha MONTOYA RN, BSN

## 2021-06-08 RX ORDER — LEVONORGESTREL/ETHIN.ESTRADIOL 0.1-0.02MG
1 TABLET ORAL DAILY
Qty: 84 TABLET | Refills: 1 | Status: SHIPPED | OUTPATIENT
Start: 2021-06-08 | End: 2022-07-08

## 2021-09-07 ENCOUNTER — HOSPITAL ENCOUNTER (OUTPATIENT)
Dept: MAMMOGRAPHY | Facility: CLINIC | Age: 56
Discharge: HOME | End: 2021-09-07
Attending: NURSE PRACTITIONER | Admitting: NURSE PRACTITIONER
Payer: COMMERCIAL

## 2021-09-07 DIAGNOSIS — Z12.31 VISIT FOR SCREENING MAMMOGRAM: ICD-10-CM

## 2021-09-07 PROCEDURE — 77067 SCR MAMMO BI INCL CAD: CPT

## 2021-09-18 ENCOUNTER — HEALTH MAINTENANCE LETTER (OUTPATIENT)
Age: 56
End: 2021-09-18

## 2021-09-23 ENCOUNTER — OFFICE VISIT (OUTPATIENT)
Dept: FAMILY MEDICINE | Facility: CLINIC | Age: 56
End: 2021-09-23
Payer: COMMERCIAL

## 2021-09-23 VITALS
DIASTOLIC BLOOD PRESSURE: 80 MMHG | OXYGEN SATURATION: 99 % | BODY MASS INDEX: 27.21 KG/M2 | TEMPERATURE: 98.1 F | WEIGHT: 169.3 LBS | HEIGHT: 66 IN | SYSTOLIC BLOOD PRESSURE: 126 MMHG | HEART RATE: 75 BPM

## 2021-09-23 DIAGNOSIS — J30.2 SEASONAL ALLERGIC RHINITIS, UNSPECIFIED TRIGGER: ICD-10-CM

## 2021-09-23 DIAGNOSIS — M77.11 LATERAL EPICONDYLITIS OF RIGHT ELBOW: Primary | ICD-10-CM

## 2021-09-23 DIAGNOSIS — H69.93 DYSFUNCTION OF BOTH EUSTACHIAN TUBES: ICD-10-CM

## 2021-09-23 DIAGNOSIS — Z12.11 SPECIAL SCREENING FOR MALIGNANT NEOPLASMS, COLON: ICD-10-CM

## 2021-09-23 DIAGNOSIS — Z23 NEED FOR PROPHYLACTIC VACCINATION AND INOCULATION AGAINST INFLUENZA: ICD-10-CM

## 2021-09-23 PROCEDURE — G0008 ADMIN INFLUENZA VIRUS VAC: HCPCS | Performed by: NURSE PRACTITIONER

## 2021-09-23 PROCEDURE — 90682 RIV4 VACC RECOMBINANT DNA IM: CPT | Performed by: NURSE PRACTITIONER

## 2021-09-23 PROCEDURE — 99214 OFFICE O/P EST MOD 30 MIN: CPT | Mod: 25 | Performed by: NURSE PRACTITIONER

## 2021-09-23 ASSESSMENT — MIFFLIN-ST. JEOR: SCORE: 1393.66

## 2021-09-23 NOTE — PATIENT INSTRUCTIONS
Patient Education     Treating Tennis Elbow    Your treatment will depend on how inflamed your tendon is. The goal is to ease your symptoms and help you regain full use of your elbow.  Rest and medicine  Wear a tennis elbow splint to let the inflamed tendon rest and heal. It must be worn correctly. It should be placed down the arm past the painful area of the elbow. It can make symptoms worse if it's directly over the inflamed tendon. Take stress off the tendon by using your other hand or changing your . Take NSAIDs (nonsteroidal anti-inflammatory drugs) and use ice to ease pain and swelling.  Exercises and therapy  Your healthcare provider may give you an exercise program. He or she may send you to a physical therapist. That expert will teach you how to gently stretch and strengthen the muscles around your elbow.  Anti-inflammatory shots  Your healthcare provider may give you shots of an anti-inflammatory medicine such as cortisone. This helps ease swelling. You may have more pain at first. But your elbow should feel better in a few days.  If surgery is needed  If your symptoms go on for a long time, or other treatments don t work, your healthcare provider may recommend surgery. Surgery repairs the inflamed tendon.  [employer] last reviewed this educational content on 1/1/2018 2000-2021 [employer]. All rights reserved. This information is not intended as a substitute for professional medical care. Always follow your healthcare professional's instructions.

## 2021-09-23 NOTE — PROGRESS NOTES
"    Assessment & Plan   1. Lateral epicondylitis of right elbow  Worsening tendinopathy of right elbow. Discussed avoiding any provoking activities, trial of wrist brace, stretching/massage.      Discussed NSAIDs - Ibuprofen 600-800 mg every 8 hours.    - Physical Therapy Referral; Future  - Orthopedic  Referral; Future    2. Seasonal allergic rhinitis, unspecified trigger  Persistent sinus pressure, congestion, rhinorrhea with daily antihistamine and trial of 2 different nasal sprays.   - Otolaryngology Referral; Future    3. Dysfunction of both eustachian tubes  Chronic effusions and drainage from bilateral ears for the past 2 years.   - Referral to ENT    4. Special screening for malignant neoplasms, colon  Due for colon cancer screen.  - Fecal colorectal cancer screen (FIT); Future    5. Need for prophylactic vaccination and inoculation against influenza  - INFLUENZA QUAD, RECOMBINANT, P-FREE (RIV4) (FLUBLOK)    Plan to follow up as needed for worsening symptoms.            BMI:   Estimated body mass index is 27.12 kg/m  as calculated from the following:    Height as of this encounter: 1.683 m (5' 6.25\").    Weight as of this encounter: 76.8 kg (169 lb 4.8 oz).   Weight management plan: Discussed healthy diet and exercise guidelines    Patient Instructions     Patient Education     Treating Tennis Elbow    Your treatment will depend on how inflamed your tendon is. The goal is to ease your symptoms and help you regain full use of your elbow.  Rest and medicine  Wear a tennis elbow splint to let the inflamed tendon rest and heal. It must be worn correctly. It should be placed down the arm past the painful area of the elbow. It can make symptoms worse if it's directly over the inflamed tendon. Take stress off the tendon by using your other hand or changing your . Take NSAIDs (nonsteroidal anti-inflammatory drugs) and use ice to ease pain and swelling.  Exercises and " therapy  Your healthcare provider may give you an exercise program. He or she may send you to a physical therapist. That expert will teach you how to gently stretch and strengthen the muscles around your elbow.  Anti-inflammatory shots  Your healthcare provider may give you shots of an anti-inflammatory medicine such as cortisone. This helps ease swelling. You may have more pain at first. But your elbow should feel better in a few days.  If surgery is needed  If your symptoms go on for a long time, or other treatments don t work, your healthcare provider may recommend surgery. Surgery repairs the inflamed tendon.  [employer] last reviewed this educational content on 1/1/2018 2000-2021 [employer]. All rights reserved. This information is not intended as a substitute for professional medical care. Always follow your healthcare professional's instructions.               No follow-ups on file.    Leola Reyna NP  [facility] AMBER Kang is a 55 year old who presents for the following health issues     Chief Complaint   Patient presents with     Ear Problem     Musculoskeletal Problem       HPI     Musculoskeletal problem/pain  Onset/Duration: Since June 20th   Description  Location: right arm and elbow   Joint Swelling: YES- a little   Redness: no  Pain: YES- shooting stabbing pains that can move to her fingers, forearm, elbow and hand.   Warmth: no  Intensity:  2/10 worse: 7/10   Progression of Symptoms:  worsening  Accompanying signs and symptoms:   Fevers: no  Numbness/tingling/weakness: YES  History  Trauma to the area: YES- was cooking a big pot of meat and stirring it around in the pot.   Recent illness:  no  Previous similar problem: YES- has had tennis elbow twice in the past in here right forearm.   Previous evaluation:  no  Precipitating or alleviating factors:  Aggravating factors include: grabbing, squeezing, walking ,  opening things  Therapies tried  "and outcome: tennis elbow brace, ice, ibuprofen, at home exercises     Acute pain in right arm and elbow triggered by a scooping/stirring motion. Worsening over the last 3 months. Impacting her ability to do her yard work and daily household activities due to discomfort. Has used tennis elbow strap which is helpful, has not tried wrist brace. Denies numbness/tingling of right upper extremity or fingers. Would like a referral to orthopedics.      Concern - Fluid in ears   Onset: Many Months   Description: She has fluid in both of ears.Wakes up in the middle of the night, she can feel fluid running down her ears and her ears \"crackle\" Also feels dizzy.   Intensity: moderate  Progression of Symptoms:  constant  Accompanying Signs & Symptoms: none   Previous history of similar problem: YES   Alleviating factors:        Improved by: none   Therapies tried and outcome: nasal sprays- have not  helped.     She reports she has had fluid in her ears bilaterally for the past 2 years with clear drainage observed a few times per week when she is in bed. Associated symptoms include rhinorrhea, sinus pressure, and occasional otalgia and dizziness. She has tried azelastine and olopatadine nasal sprays that were ineffective. She takes cetirizine daily for allergies.         Review of Systems   CONSTITUTIONAL:NEGATIVE for fever, chills, change in weight  ENT/MOUTH:  See HPI  RESP:NEGATIVE for significant cough or SOB  CV: NEGATIVE for chest pain, palpitations or peripheral edema  MUSCULOSKELETAL: See HPI      Objective    /80 (BP Location: Right arm, Patient Position: Sitting, Cuff Size: Adult Large)   Pulse 75   Temp 98.1  F (36.7  C) (Tympanic)   Ht 1.683 m (5' 6.25\")   Wt 76.8 kg (169 lb 4.8 oz)   SpO2 99%   BMI 27.12 kg/m    Body mass index is 27.12 kg/m .     Physical Exam   GENERAL: healthy, alert and no distress  HENT: normal cephalic/atraumatic, right ear: clear effusion, left ear: clear effusion, rhinorrhea " clear, nasal polyp left nare. Oropharynx clear  RESP: lungs clear to auscultation - no rales, rhonchi or wheezes  CV: regular rate and rhythm, normal S1 S2, no S3 or S4, no murmur, click or rub, no peripheral edema and peripheral pulses strong  MS: RUE Exam: No edema, erythema, or joint deformity. Medial epicondyle and lateral epicondyle tenderness. Discomfort with elbow extension, wrist flexion, and wrist extension.

## 2021-09-29 ENCOUNTER — LAB (OUTPATIENT)
Dept: LAB | Facility: CLINIC | Age: 56
End: 2021-09-29
Payer: COMMERCIAL

## 2021-09-29 DIAGNOSIS — Z12.11 SPECIAL SCREENING FOR MALIGNANT NEOPLASMS, COLON: ICD-10-CM

## 2021-09-29 PROCEDURE — 82274 ASSAY TEST FOR BLOOD FECAL: CPT

## 2021-09-30 DIAGNOSIS — M77.11 LATERAL EPICONDYLITIS OF RIGHT ELBOW: Primary | ICD-10-CM

## 2021-10-02 LAB — HEMOCCULT STL QL IA: NEGATIVE

## 2021-10-15 ENCOUNTER — OFFICE VISIT (OUTPATIENT)
Dept: ORTHOPEDICS | Facility: CLINIC | Age: 56
End: 2021-10-15
Payer: COMMERCIAL

## 2021-10-15 ENCOUNTER — ANCILLARY PROCEDURE (OUTPATIENT)
Dept: GENERAL RADIOLOGY | Facility: CLINIC | Age: 56
End: 2021-10-15
Attending: PEDIATRICS
Payer: COMMERCIAL

## 2021-10-15 VITALS
DIASTOLIC BLOOD PRESSURE: 91 MMHG | BODY MASS INDEX: 27.16 KG/M2 | WEIGHT: 169 LBS | HEIGHT: 66 IN | SYSTOLIC BLOOD PRESSURE: 161 MMHG

## 2021-10-15 DIAGNOSIS — M77.11 RIGHT LATERAL EPICONDYLITIS: ICD-10-CM

## 2021-10-15 DIAGNOSIS — M25.521 RIGHT ELBOW PAIN: ICD-10-CM

## 2021-10-15 DIAGNOSIS — M25.521 RIGHT ELBOW PAIN: Primary | ICD-10-CM

## 2021-10-15 PROCEDURE — 99203 OFFICE O/P NEW LOW 30 MIN: CPT | Performed by: PEDIATRICS

## 2021-10-15 PROCEDURE — 73080 X-RAY EXAM OF ELBOW: CPT | Mod: RT | Performed by: RADIOLOGY

## 2021-10-15 ASSESSMENT — MIFFLIN-ST. JEOR: SCORE: 1388.33

## 2021-10-15 NOTE — PROGRESS NOTES
ASSESSMENT & PLAN    Pearl was seen today for pain and pain.    Diagnoses and all orders for this visit:    Right elbow pain  -     XR Elbow Right G/E 3 Views; Future    Right lateral epicondylitis  -     Occupational Therapy Referral; Future      This issue is chronic and Worsening.    Discussed the causes and treatment of lateral epicondylitis including ice, heat, stretching, massage, over the counter anti-inflammatory medications, elbow strap, and wrist brace use. Discussed the importance of eccentric strengthening - home exercise program or hand therapy appointment.  Rest as possible from activities that cause pain.  Home handouts provided.    Plan:  - Today's Plan of Care:  Rehab: Occupational Therapy: [facility] - [phone]  Discussed activity considerations and other supportive care including Ice/Heat, OTC and other topical medications as needed. Voltaren Gel.    -We also discussed other future treatment options:  MRI right elbow  Dry Needling with PT - will inquire    Follow Up: 6 - 8 weeks    Concerning signs and symptoms were reviewed.  The patient expressed understanding of this management plan and all questions were answered at this time.    Thanks for the opportunity to participate in the care of this patient, I will keep you updated on their progress.    CC: Leola Marvin MD [facility]  Sports Medicine Physician  [facility]    -----  Chief Complaint   Patient presents with     Right Forearm - Pain     Right Elbow - Pain       SUBJECTIVE  Pearl Gibson is a/an 55 year old female who is seen in consultation at the request of  Leola Reyna N.P. for evaluation of right forearm and right elbow.     The patient is seen by themselves.  The patient is Right handed    Onset: 4 month(s) ago. 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  Location of Pain: right forearm and right elbow, lateral epicondyle,  "occasionally olecranon process  Worsened by: curling her hair, grabbing, cooking, toileting  Better with: elbow bracing, resting, wrist brace   Treatments tried: rest/activity avoidance, elevation, ice, heat, Tylenol, ibuprofen, Aleve, home exercises and casting/splinting/bracing  Associated symptoms: swelling, numbness, tingling, weakness of hand and shooting pains    Orthopedic/Surgical history: YES - possibly a couple times, also from cooking   Social History/Occupation: cooking, weight training, walking    No family history pertinent to patient's problem today.    REVIEW OF SYSTEMS:  Review of Systems  Skin: no bruising, no swelling  Musculoskeletal: as above  Neurologic: occastional numbness, paresthesias  Remainder of review of systems is negative including constitutional, CV, pulmonary, GI, except as noted in HPI or medical history.    OBJECTIVE:  BP (!) 161/91   Ht 1.676 m (5' 6\")   Wt 76.7 kg (169 lb)   BMI 27.28 kg/m     General: healthy, alert and in no distress  HEENT: no scleral icterus or conjunctival erythema  Skin: no suspicious lesions or rash. No jaundice.  CV: distal perfusion intact  Resp: normal respiratory effort without conversational dyspnea   Psych: normal mood and affect  Gait: normal steady gait with appropriate coordination and balance  Neuro: Normal light sensory exam of upper extremity    Bilateral Elbow exam:  Inspection:     no ecchymosis       no edema or effusion    Tender:     lateral epicondyle right    Non-Tender:      remainder of the elbow bilaterally    ROM:      full with flexion, extension, forearm supination and pronation bilateral    Strength:     flexion 5/5 bilateral       extension 5/5 bilateral       forearm supination 5/5 bilateral       forearm pronation 5/5 bilateral       pain with resisted wrist extension and pronation/supination right    Special Tests:      tennis elbow test (resisted extension of third digit) positive (+) right    Sensation:     intact " throughout hand       intact throughout forearm    RADIOLOGY:  I independently ordered, visualized and reviewed these images with the patient  3 XR views of right elbow reviewed: no acute bony abnormality, no significant degenerative change  - will follow official read      Review of the result(s) of each unique test - XR

## 2021-10-15 NOTE — LETTER
10/15/2021         RE: Pearl Gibson  [address] [facility] [address]        Dear Colleague,    Thank you for referring your patient, Pearl Gibson, to the [facility] [place]. Please see a copy of my visit note below.    ASSESSMENT & PLAN    Pearl was seen today for pain and pain.    Diagnoses and all orders for this visit:    Right elbow pain  -     XR Elbow Right G/E 3 Views; Future    Right lateral epicondylitis  -     Occupational Therapy Referral; Future      This issue is chronic and Worsening.    Discussed the causes and treatment of lateral epicondylitis including ice, heat, stretching, massage, over the counter anti-inflammatory medications, elbow strap, and wrist brace use. Discussed the importance of eccentric strengthening - home exercise program or hand therapy appointment.  Rest as possible from activities that cause pain.  Home handouts provided.    Plan:  - Today's Plan of Care:  Rehab: Occupational Therapy: [facility] - [phone]  Discussed activity considerations and other supportive care including Ice/Heat, OTC and other topical medications as needed. Voltaren Gel.    -We also discussed other future treatment options:  MRI right elbow  Dry Needling with PT - will inquire    Follow Up: 6 - 8 weeks    Concerning signs and symptoms were reviewed.  The patient expressed understanding of this management plan and all questions were answered at this time.    Thanks for the opportunity to participate in the care of this patient, I will keep you updated on their progress.    CC: Leola Marvin MD [facility]  Sports Medicine Physician  [facility]    -----  Chief Complaint   Patient presents with     Right Forearm - Pain     Right Elbow - Pain       SUBJECTIVE  Pearl Gibson is a/an 55 year old female who is seen in consultation at the request of  Loela Reyna N.P. for evaluation of  "right forearm and right elbow.     The patient is seen by themselves.  The patient is Right handed    Onset: 4 month(s) ago. 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  Location of Pain: right forearm and right elbow, lateral epicondyle, occasionally olecranon process  Worsened by: curling her hair, grabbing, cooking, toileting  Better with: elbow bracing, resting, wrist brace   Treatments tried: rest/activity avoidance, elevation, ice, heat, Tylenol, ibuprofen, Aleve, home exercises and casting/splinting/bracing  Associated symptoms: swelling, numbness, tingling, weakness of hand and shooting pains    Orthopedic/Surgical history: YES - possibly a couple times, also from cooking   Social History/Occupation: cooking, weight training, walking    No family history pertinent to patient's problem today.    REVIEW OF SYSTEMS:  Review of Systems  Skin: no bruising, no swelling  Musculoskeletal: as above  Neurologic: occastional numbness, paresthesias  Remainder of review of systems is negative including constitutional, CV, pulmonary, GI, except as noted in HPI or medical history.    OBJECTIVE:  BP (!) 161/91   Ht 1.676 m (5' 6\")   Wt 76.7 kg (169 lb)   BMI 27.28 kg/m     General: healthy, alert and in no distress  HEENT: no scleral icterus or conjunctival erythema  Skin: no suspicious lesions or rash. No jaundice.  CV: distal perfusion intact  Resp: normal respiratory effort without conversational dyspnea   Psych: normal mood and affect  Gait: normal steady gait with appropriate coordination and balance  Neuro: Normal light sensory exam of upper extremity    Bilateral Elbow exam:  Inspection:     no ecchymosis       no edema or effusion    Tender:     lateral epicondyle right    Non-Tender:      remainder of the elbow bilaterally    ROM:      full with flexion, extension, forearm supination and pronation bilateral    Strength:     flexion 5/5 bilateral       extension 5/5 bilateral       " forearm supination 5/5 bilateral       forearm pronation 5/5 bilateral       pain with resisted wrist extension and pronation/supination right    Special Tests:      tennis elbow test (resisted extension of third digit) positive (+) right    Sensation:     intact throughout hand       intact throughout forearm    RADIOLOGY:  I independently ordered, visualized and reviewed these images with the patient  3 XR views of right elbow reviewed: no acute bony abnormality, no significant degenerative change  - will follow official read      Review of the result(s) of each unique test - XR           Again, thank you for allowing me to participate in the care of your patient.        Sincerely,        Rolanda Marvin MD

## 2021-10-15 NOTE — PATIENT INSTRUCTIONS
Discussed the causes and treatment of lateral epicondylitis including ice, heat, stretching, massage, over the counter anti-inflammatory medications, elbow strap, and wrist brace use. Discussed the importance of eccentric strengthening - home exercise program or hand therapy appointment.  Rest as possible from activities that cause pain.  Home handouts provided.    Plan:  - Today's Plan of Care:  Rehab: Occupational Therapy: [facility] - [phone]  Discussed activity considerations and other supportive care including Ice/Heat, OTC and other topical medications as needed. Voltaren Gel.    -We also discussed other future treatment options:  MRI right elbow  Dry Needling with PT    Follow Up: 6 - 8 weeks    If you have any further questions for your physician or physician s care team you can call [phone] and use option 3 to leave a voice message. Calls received during business hours will be returned same day.

## 2022-01-25 ENCOUNTER — TELEPHONE (OUTPATIENT)
Dept: FAMILY MEDICINE | Facility: CLINIC | Age: 57
End: 2022-01-25
Payer: COMMERCIAL

## 2022-01-25 NOTE — TELEPHONE ENCOUNTER
Reason for Call:  Other call back    Detailed comments: Patient has a yeast infection and treated it with  medication and is asking if that will hurt her and if she can go buy new stuff and use it.    Phone Number Patient can be reached at: Cell number on file:    Telephone Information:   Mobile [phone]       Best Time:     Can we leave a detailed message on this number? YES    Call taken on 2022 at 9:00 AM by Taylor David

## 2022-01-26 ENCOUNTER — VIRTUAL VISIT (OUTPATIENT)
Dept: FAMILY MEDICINE | Facility: CLINIC | Age: 57
End: 2022-01-26
Payer: COMMERCIAL

## 2022-01-26 DIAGNOSIS — N76.2 VULVAR CELLULITIS: Primary | ICD-10-CM

## 2022-01-26 PROCEDURE — 99213 OFFICE O/P EST LOW 20 MIN: CPT | Mod: TEL | Performed by: FAMILY MEDICINE

## 2022-01-26 RX ORDER — DOXYCYCLINE 100 MG/1
100 CAPSULE ORAL 2 TIMES DAILY
Qty: 14 CAPSULE | Refills: 0 | Status: SHIPPED | OUTPATIENT
Start: 2022-01-26 | End: 2022-02-02

## 2022-01-26 NOTE — TELEPHONE ENCOUNTER
"Called pt & she states has burning/itching & inflammation. Redness and swelling in one spot.   Used  OTC miconazole from 2016. Thought discharge looked \"waxy\" after using this.  Bought new OTC miconizole & used last night.   Also applied hydrocortisone to vulva for the inflammation.  No burning with urination & no other urinary sx.  Pt leaving for vacation on .   Virtual appt made for today.    Lian Donnelly RN      "

## 2022-01-26 NOTE — PROGRESS NOTES
Pearl is a 56 year old who is being evaluated via a billable telephone visit.      What phone number would you like to be contacted at? [phone]  How would you like to obtain your AVS? MyChart    Assessment & Plan     Vulvar irritation currently due to tampon string rubbing (currently swelling and redness and pain all improving) so continue hydrocortisone up to 14 days and miconazole (5 days): concern if it worsens is for Vulvar cellulitis so discussed warning signs with patient and if these occur plan to start doxycyline.  Patient flying to [place] and walking a lot next week so discussed if worsens then treat vulvar cellulitis.  Not likely herpes.    - doxycycline hyclate (VIBRAMYCIN) 100 MG capsule; Take 1 capsule (100 mg) by mouth 2 times daily for 7 days             No follow-ups on file.    Junior Vale MD  [facility]    Celia Kang is a 56 year old who presents for the following health issues     HPI     Vaginal Symptoms  Onset/Duration: x 1.5 weeks. Did have clear blister but that has subside but it looks scaly. Does appear to have some swelling.  Started exercise with some itching then again few days later with exercise again had swelling and irritation.   -Patient did just start back up exercising so maybe it is irritated from that. Patient states she also just got a period and hasn't been changing her tampon as often. Patient is going through some menopausal stuff as well.  Description:  Vaginal Discharge: none   Itching (Pruritis): YES  Burning sensation:  Yes- vulva area  Odor: no  Accompanying Signs & Symptoms:  Urinary symptoms: no  Abdominal pain: no  Fever: no  History:   Sexually active: YES  New Partner: no  Possibility of Pregnancy:  no  Recent antibiotic use: no  Previous vaginitis issues: YES- long while ago.  Precipitating or alleviating factors: None  Therapies tried and outcome: Monistat- felt a little better. Hydrocortisone cream and  metroconizole cream.    No prior history of herpes, only one partner. No concern for herpes.  The swelling and redness are going down but still scaly      Review of Systems       Objective           Vitals:  No vitals were obtained today due to virtual visit.    Physical Exam   healthy, alert and no distress  PSYCH: Alert and oriented times 3; coherent speech, normal   rate and volume, able to articulate logical thoughts, able   to abstract reason, no tangential thoughts, no hallucinations   or delusions  Her affect is normal and pleasant  RESP: No cough, no audible wheezing, able to talk in full sentences  Remainder of exam unable to be completed due to telephone visits            Phone call duration: 19 minutes

## 2022-01-26 NOTE — PATIENT INSTRUCTIONS
If any increase in redness or swelling or pain those are reasons to start the antibiotic doxycyline.   Continue miconazole (max of 5 days) and hydrocortisone 1% for 1-2 weeks to keep the area calm.

## 2022-06-19 ENCOUNTER — HEALTH MAINTENANCE LETTER (OUTPATIENT)
Age: 57
End: 2022-06-19

## 2022-07-08 DIAGNOSIS — N95.1 MENOPAUSAL SYNDROME (HOT FLASHES): ICD-10-CM

## 2022-07-08 DIAGNOSIS — N95.1 PERI-MENOPAUSE: ICD-10-CM

## 2022-07-08 RX ORDER — LEVONORGESTREL/ETHIN.ESTRADIOL 0.1-0.02MG
1 TABLET ORAL DAILY
Qty: 84 TABLET | Refills: 1 | Status: SHIPPED | OUTPATIENT
Start: 2022-07-08 | End: 2022-12-14

## 2022-09-08 ENCOUNTER — HOSPITAL ENCOUNTER (OUTPATIENT)
Dept: MAMMOGRAPHY | Facility: CLINIC | Age: 57
Discharge: HOME | End: 2022-09-08
Attending: NURSE PRACTITIONER | Admitting: NURSE PRACTITIONER
Payer: COMMERCIAL

## 2022-09-08 DIAGNOSIS — Z12.31 VISIT FOR SCREENING MAMMOGRAM: ICD-10-CM

## 2022-09-08 PROCEDURE — 77067 SCR MAMMO BI INCL CAD: CPT

## 2022-11-19 ENCOUNTER — HEALTH MAINTENANCE LETTER (OUTPATIENT)
Age: 57
End: 2022-11-19

## 2023-02-07 ENCOUNTER — LAB (OUTPATIENT)
Dept: FAMILY MEDICINE | Facility: CLINIC | Age: 58
End: 2023-02-07

## 2023-02-07 ENCOUNTER — MYC MEDICAL ADVICE (OUTPATIENT)
Dept: FAMILY MEDICINE | Facility: CLINIC | Age: 58
End: 2023-02-07

## 2023-02-07 DIAGNOSIS — Z12.11 SCREEN FOR COLON CANCER: ICD-10-CM

## 2023-02-07 DIAGNOSIS — L98.9 SKIN LESION: Primary | ICD-10-CM

## 2023-02-07 PROBLEM — M77.11 RIGHT LATERAL EPICONDYLITIS: Status: ACTIVE | Noted: 2021-09-23

## 2023-02-07 PROBLEM — N95.1 VASOMOTOR SYMPTOMS DUE TO MENOPAUSE: Status: ACTIVE | Noted: 2020-03-05

## 2023-02-07 PROBLEM — R03.0 ELEVATED BLOOD PRESSURE READING WITHOUT DIAGNOSIS OF HYPERTENSION: Status: ACTIVE | Noted: 2023-02-07

## 2023-02-07 PROBLEM — N92.6 IRREGULAR PERIODS: Status: ACTIVE | Noted: 2023-02-07

## 2023-02-20 ENCOUNTER — MYC MEDICAL ADVICE (OUTPATIENT)
Dept: FAMILY MEDICINE | Facility: CLINIC | Age: 58
End: 2023-02-20

## 2023-02-21 ENCOUNTER — OFFICE VISIT (OUTPATIENT)
Dept: ORTHOPEDICS | Facility: CLINIC | Age: 58
End: 2023-02-21
Attending: NURSE PRACTITIONER
Payer: COMMERCIAL

## 2023-02-21 VITALS — SYSTOLIC BLOOD PRESSURE: 176 MMHG | DIASTOLIC BLOOD PRESSURE: 97 MMHG

## 2023-02-21 DIAGNOSIS — G89.29 CHRONIC ELBOW PAIN, RIGHT: Primary | ICD-10-CM

## 2023-02-21 DIAGNOSIS — M77.11 RIGHT LATERAL EPICONDYLITIS: ICD-10-CM

## 2023-02-21 DIAGNOSIS — M25.521 CHRONIC ELBOW PAIN, RIGHT: Primary | ICD-10-CM

## 2023-02-21 LAB — NONINV COLON CA DNA+OCC BLD SCRN STL QL: NEGATIVE

## 2023-02-21 PROCEDURE — 99214 OFFICE O/P EST MOD 30 MIN: CPT | Performed by: FAMILY MEDICINE

## 2023-02-21 NOTE — PROGRESS NOTES
Pearl Gibson  :  1965  DOS: 2023  MRN: [id number]    Sports Medicine Clinic Visit    PCP: Leola Reyna    Pearl Gibson is a 57 year old Right hand dominant female who is seen in consultation at the request of  Leola Reyna N.P. presenting with chronic right elbow pain.    Injury: Gradual onset of pain over the past ~ 18+ months that initially started while stirring for prolonged period cooking.  Pain located over right lateral posterior elbow, radiating to dorsal forearm.  Additional Features:  Positive: weakness and aching pain.  Symptoms are better with Tylenol, Rest and counter force strap.  Symptoms are worse with: gripping/grasping, cooking, lifting.  Other evaluation and/or treatments so far consists of: Ice, Ibuprofen, Rest and wrist brace, counter force strap, HEP, Sports Med (Dr Marvin).  Recent imaging completed: X-rays completed 10/2021.  Prior History of related problems: history of intermittent elbow over past 5+ years - previously resolved without intervention    Social History: homemaker, excercises 3 - 4x/week (weight training, cardio)    Review of Systems  Musculoskeletal: as above  Remainder of review of systems is negative including constitutional, CV, pulmonary, GI, Skin and Neurologic except as noted in HPI or medical history.    Past Medical History:   Diagnosis Date     Cellulitis and abscess of unspecified site -2005     Hypoglycemia 2006    Problem list name updated by automated process. Provider to review     Lateral epicondylitis of right elbow 2021     Need for prophylactic hormone replacement therapy (postmenopausal)     birth control     Past Surgical History:   Procedure Laterality Date     BIOPSY BREAST Left 2019     SURGICAL HISTORY OF -   10/16/1997    D&C     Family History   Problem Relation Age of Onset     Arthritis Mother      Cerebrovascular Disease Mother      Cancer Father         SKIN     Heart Disease Father         " heart surgery - ?valve     Gastrointestinal Disease Father         colon polyps     Lipids Sister      Alcohol/Drug Brother      Allergies Sister      Genitourinary Problems Sister         KIDNEY STONES     Obesity Sister      Lung Cancer Sister 68        stage 4     Obesity Sister      Cancer - colorectal Maternal Grandmother      Depression Daughter      Anxiety Disorder Daughter      Breast Cancer No family hx of          Objective  BP (!) 176/97   Ht (P) 1.67 m (5' 5.75\")   Wt (P) 79.4 kg (175 lb)   LMP 02/01/2023 (Approximate)   BMI (P) 28.46 kg/m        General: healthy, alert and in no distress      HEENT: no scleral icterus or conjunctival erythema     Skin: no suspicious lesions or rash. No jaundice.     CV: regular rhythm by palpation, 2+ distal pulses, no pedal edema      Resp: normal respiratory effort without conversational dyspnea     Psych: normal mood and affect      Gait: nonantalgic, appropriate coordination and balance     Neuro: normal light touch sensory exam of the extremities. Motor strength as noted below     Right Elbow exam  Inspection: no swelling  Tender: lateral epicondyle and common extensor tendon, mild distal triceps  Non-tender: medial epicondyle, common flexor tendon, flexor muscle of forearm, supracondylar notch, olecranon bursa, distal bicep tendon and radial head/neck  Range of Motion: all normal  Strength: elbow strength full and pain with resisted wrist extension and supination  Special tests: normal stability, normal valgus stress, normal varus stress, ulnar Tinel's negative, no pain with resisted middle finger extension, no pain with resisted wrist flexion:     Radiology:  XR ELBOW RT G/E 3 VIEWS 10/15/2021 11:17 AM      HISTORY: Right elbow pain                                                                   IMPRESSION: Negative exam.    Assessment:  1. Chronic elbow pain, right    2. Right lateral epicondylitis        Plan:  Discussed the assessment with the " patient.  Follow up: prn based on clinical progress  Supportive and conservative care strategies reviewed  Ergonomics considerations reviewed, as well as postural control  Wrist brace provided and use strategies reviewed  Use of short 1-2 wk course of NSAIDs reviewed, dosing and precautions reviewed if utilized  Consider voltaren gel for decreasing local inflammation  Consideration of CSI in future but hope to avoid  Hand therapy referral placed today, okay for iontophoresis if indicated  Tenex and PRP also an option based on shorter term improvement  We discussed modified progressive pain-free activity as tolerated  Home handouts provided and supportive care reviewed  All questions were answered today  Contact us with additional questions or concerns  Signs and sx of concern reviewed      Brock Celis DO, CR  Primary Care Sports Medicine  [facility]     Time spent in chart review, one-on-one evaluation, discussion with patient regarding: nature of problem, clinical course, prior treatments, therapeutic options, shared-decision making, potential procedures and referrals, and charting related to the visit: 31 minutes.  If applicable, time does not include time spent performing any procedure.      Disclaimer: This note consists of symbols derived from keyboarding, dictation and/or voice recognition software. As a result, there may be errors in the script that have gone undetected. Please consider this when interpreting information found in this chart.

## 2023-02-21 NOTE — LETTER
2023         RE: Pearl Gibson  [address] [facility] [address]        Dear Colleague,    Thank you for referring your patient, Pearl Gibson, to the [facility] [place]. Please see a copy of my visit note below.    Pearl Gibson  :  1965  DOS: 2023  MRN: [id number]    Sports Medicine Clinic Visit    PCP: Leola Reyna    Pearl Gibson is a 57 year old Right hand dominant female who is seen in consultation at the request of  Leola Reyna N.P. presenting with chronic right elbow pain.    Injury: Gradual onset of pain over the past ~ 18+ months that initially started while stirring for prolonged period cooking.  Pain located over right lateral posterior elbow, radiating to dorsal forearm.  Additional Features:  Positive: weakness and aching pain.  Symptoms are better with Tylenol, Rest and counter force strap.  Symptoms are worse with: gripping/grasping, cooking, lifting.  Other evaluation and/or treatments so far consists of: Ice, Ibuprofen, Rest and wrist brace, counter force strap, HEP, Sports Med (Dr Marvin).  Recent imaging completed: X-rays completed 10/2021.  Prior History of related problems: history of intermittent elbow over past 5+ years - previously resolved without intervention    Social History: homemaker, excercises 3 - 4x/week (weight training, cardio)    Review of Systems  Musculoskeletal: as above  Remainder of review of systems is negative including constitutional, CV, pulmonary, GI, Skin and Neurologic except as noted in HPI or medical history.    Past Medical History:   Diagnosis Date     Cellulitis and abscess of unspecified site -2005     Hypoglycemia 2006    Problem list name updated by automated process. Provider to review     Lateral epicondylitis of right elbow 2021     Need for prophylactic hormone replacement therapy (postmenopausal)     birth control     Past Surgical History:  "  Procedure Laterality Date     BIOPSY BREAST Left 2019     SURGICAL HISTORY OF -   10/16/1997    D&C     Family History   Problem Relation Age of Onset     Arthritis Mother      Cerebrovascular Disease Mother      Cancer Father         SKIN     Heart Disease Father         heart surgery - ?valve     Gastrointestinal Disease Father         colon polyps     Lipids Sister      Alcohol/Drug Brother      Allergies Sister      Genitourinary Problems Sister         KIDNEY STONES     Obesity Sister      Lung Cancer Sister 68        stage 4     Obesity Sister      Cancer - colorectal Maternal Grandmother      Depression Daughter      Anxiety Disorder Daughter      Breast Cancer No family hx of          Objective  BP (!) 176/97   Ht (P) 1.67 m (5' 5.75\")   Wt (P) 79.4 kg (175 lb)   LMP 02/01/2023 (Approximate)   BMI (P) 28.46 kg/m        General: healthy, alert and in no distress      HEENT: no scleral icterus or conjunctival erythema     Skin: no suspicious lesions or rash. No jaundice.     CV: regular rhythm by palpation, 2+ distal pulses, no pedal edema      Resp: normal respiratory effort without conversational dyspnea     Psych: normal mood and affect      Gait: nonantalgic, appropriate coordination and balance     Neuro: normal light touch sensory exam of the extremities. Motor strength as noted below     Right Elbow exam  Inspection: no swelling  Tender: lateral epicondyle and common extensor tendon, mild distal triceps  Non-tender: medial epicondyle, common flexor tendon, flexor muscle of forearm, supracondylar notch, olecranon bursa, distal bicep tendon and radial head/neck  Range of Motion: all normal  Strength: elbow strength full and pain with resisted wrist extension and supination  Special tests: normal stability, normal valgus stress, normal varus stress, ulnar Tinel's negative, no pain with resisted middle finger extension, no pain with resisted wrist flexion:     Radiology:  XR ELBOW RT G/E 3 VIEWS " 10/15/2021 11:17 AM      HISTORY: Right elbow pain                                                                   IMPRESSION: Negative exam.    Assessment:  1. Chronic elbow pain, right    2. Right lateral epicondylitis        Plan:  Discussed the assessment with the patient.  Follow up: prn based on clinical progress  Supportive and conservative care strategies reviewed  Ergonomics considerations reviewed, as well as postural control  Wrist brace provided and use strategies reviewed  Use of short 1-2 wk course of NSAIDs reviewed, dosing and precautions reviewed if utilized  Consider voltaren gel for decreasing local inflammation  Consideration of CSI in future but hope to avoid  Hand therapy referral placed today, okay for iontophoresis if indicated  Tenex and PRP also an option based on shorter term improvement  We discussed modified progressive pain-free activity as tolerated  Home handouts provided and supportive care reviewed  All questions were answered today  Contact us with additional questions or concerns  Signs and sx of concern reviewed      Brock Celis DO, CAQ  Primary Care Sports Medicine  [facility]     Time spent in chart review, one-on-one evaluation, discussion with patient regarding: nature of problem, clinical course, prior treatments, therapeutic options, shared-decision making, potential procedures and referrals, and charting related to the visit: 31 minutes.  If applicable, time does not include time spent performing any procedure.      Disclaimer: This note consists of symbols derived from keyboarding, dictation and/or voice recognition software. As a result, there may be errors in the script that have gone undetected. Please consider this when interpreting information found in this chart.      Again, thank you for allowing me to participate in the care of your patient.        Sincerely,        Brock Celis DO

## 2023-02-22 ENCOUNTER — HOSPITAL ENCOUNTER (OUTPATIENT)
Dept: OCCUPATIONAL THERAPY | Facility: CLINIC | Age: 58
Setting detail: THERAPIES SERIES
Discharge: HOME | End: 2023-02-22
Attending: FAMILY MEDICINE
Payer: COMMERCIAL

## 2023-02-22 DIAGNOSIS — M77.11 RIGHT LATERAL EPICONDYLITIS: ICD-10-CM

## 2023-02-22 DIAGNOSIS — M25.521 CHRONIC ELBOW PAIN, RIGHT: ICD-10-CM

## 2023-02-22 DIAGNOSIS — G89.29 CHRONIC ELBOW PAIN, RIGHT: ICD-10-CM

## 2023-02-22 PROCEDURE — 97110 THERAPEUTIC EXERCISES: CPT | Mod: GO | Performed by: OCCUPATIONAL THERAPIST

## 2023-02-22 PROCEDURE — 97140 MANUAL THERAPY 1/> REGIONS: CPT | Mod: GO | Performed by: OCCUPATIONAL THERAPIST

## 2023-02-22 PROCEDURE — 97165 OT EVAL LOW COMPLEX 30 MIN: CPT | Mod: GO | Performed by: OCCUPATIONAL THERAPIST

## 2023-02-22 PROCEDURE — 97035 APP MDLTY 1+ULTRASOUND EA 15: CPT | Mod: GO | Performed by: OCCUPATIONAL THERAPIST

## 2023-02-22 NOTE — PROGRESS NOTES
02/22/23   Hand Therapy Evaluation   General Information/History   Start Of Care Date 02/22/23   Referring Physician Dr. Celis   Orders Evaluate And Treat As Indicated   Orders Date 02/21/23   Medical Diagnosis Right Lateral Epicondylitis, Chronic  right elbow pain   Additional Occupational Profile Info/Pertinent history of current problem Patient reports gradual onset of pain in her right elbow over the past 18 months that initially started while stirring for a prolonged period of cooking. Pain is over lateral posterior elbow and can radiate to dorsal forearm   Previous treatment or current condition ice Ibuprofen, rest, wrist brace, counterforce strap, HEP   How/Where did it occur With repetition/overuse;At home   Onset date of current episode/exacerbation 06/21/21  (approximate)   Chronicity New   Hand Dominance Right   Affected side Right   Functional limitations perform activities of daily living;perform required work activities;perform desired leisure / sports activities   Reported Symptoms Pain;Tingling   Prior level of function Independent ADL;Independent IADL   Important Activities golfing, weight training, walking   Level of functions comments has had to stop those due to pain   Patient role/Employment history Homemaker   Patient/Family goals statement Would like to be able to resume full weight training   Fall Risk Screen   Fall screen completed by OT   Have you fallen 2 or more times in the past year? No   Have you fallen and had an injury in the past year? No   Is patient a fall risk? No   Abuse Screen (yes response referral indicated)   Feels Unsafe at Home or Work/School no   Feels Threatened by Someone no   Does Anyone Try to Keep You From Having Contact with Others or Doing Things Outside Your Home? no   Physical Signs of Abuse Present no   Pain   Pain Primary Pain Report   Primary Pain Report   Location Right lateral  forearm more than epicondyle   Pain Quality Sharp;Shooting;Stabbing;Aching    Frequency Intermittent   Scale 0/10;5/10   Pain Is Worse At Night;Worse In The P.M.   Pain Is Exacerbated By Lifting;Carrying;Rest;Pushing;Lying On Extremity;Twisting , Pulling   Pain Is Relieved By Rest;Heat   Progression Since Onset Gradually Improving   Edema   Edema Elbow Crease   Elbow Crease (measured in cm)   Elbow Crease -  - Left 23.5   Elbow Crease -  - Right 24.5   Tenderness   Tenderness Lateral Elbow   Lateral Elbow   Right Mild   Palpation   Palpation Assessed   Right Hand Tenderness Present At: ECRB Insertion;Lateral epicondyle   Right Hand Palpation Comments mild   ROM   ROM AROM   AROM   AROM Elbow   Comments full extension but pain   Sensation Findings   Sensation Findings Other (see comments)   Sensation Comments no sensory complaints in fingers   Strength   Strength Strength;Other (see comments)    Avg - Left 45    Avg - Right 63    Avg Comments mild    Elbow Extended Avg - Left 50    Elbow Extended Avg - Right 60   Strength Comments increased mild pain with resisted supination, ETL- right 45, left 68   Education Assessment   Preferred Learning Style Demonstration;Pictures/video   Barriers to Learning No barriers   Therapy Interventions   Planned Therapy Interventions Ultrasound;Light Therapy;Strengthening;ROM;Stretching;Manual Therapy;Edema Management;Self Care/Home Management;Ergonomic Patient Education;Home Program   Therapy plan comments To be added as appropriate   Clinical Impression   Criteria for Skilled Therapeutic Interventions Met yes;treatment indicated   OT Diagnosis decreased ADL's IADL's   Influenced by the following impairments Pain;Edema;Decreased range of motion;Decreased strength   Assessment of Occupational Performance 3-5 Performance Deficits   Identified Performance Deficits hobbies, lifting weights, reaching activities, sleeping   Clinical Decision Making (Complexity) Low complexity   Therapy Frequency up to 2x/week   Predicted Duration of Therapy  Intervention (days/wks) 6 weeks   Risks and Benefits of Treatment have been explained. Yes   Patient, Family & other staff in agreement with plan of care Yes   Hand Eval Goals   Hand Eval Goals 1;2;3   Hand Goal 1   Goal Identifier home program   Goal Description Patient to be independent with home program, not needing more than 15% verbal cuing from therapist   Target Date 03/15/23   Hand Goal 2   Goal Identifier sleeping   Goal Description Patient to score 4 on UEFI to be able to sleep without pain   Goal Progress 2 currently   Target Date 03/28/23   Hand Goal 3   Goal Identifier lifting weights   Goal Description Patient to resume weight lifting with minimal difficulty   Target Date 04/05/23   Total Evaluation Time   Priscila Arcos OTR/L CHT  Occupational Therapist, Certified Hand Therapist

## 2023-02-27 ENCOUNTER — HOSPITAL ENCOUNTER (OUTPATIENT)
Dept: OCCUPATIONAL THERAPY | Facility: CLINIC | Age: 58
Setting detail: THERAPIES SERIES
Discharge: HOME | End: 2023-02-27
Attending: FAMILY MEDICINE
Payer: COMMERCIAL

## 2023-02-27 PROCEDURE — 97140 MANUAL THERAPY 1/> REGIONS: CPT | Mod: GO | Performed by: OCCUPATIONAL THERAPIST

## 2023-02-27 PROCEDURE — 97026 INFRARED THERAPY: CPT | Mod: GO | Performed by: OCCUPATIONAL THERAPIST

## 2023-02-27 PROCEDURE — 97035 APP MDLTY 1+ULTRASOUND EA 15: CPT | Mod: GO | Performed by: OCCUPATIONAL THERAPIST

## 2023-03-01 ENCOUNTER — HOSPITAL ENCOUNTER (OUTPATIENT)
Dept: OCCUPATIONAL THERAPY | Facility: CLINIC | Age: 58
Setting detail: THERAPIES SERIES
Discharge: HOME | End: 2023-03-01
Attending: FAMILY MEDICINE
Payer: COMMERCIAL

## 2023-03-01 PROCEDURE — 97035 APP MDLTY 1+ULTRASOUND EA 15: CPT | Mod: GO | Performed by: OCCUPATIONAL THERAPIST

## 2023-03-01 PROCEDURE — 97140 MANUAL THERAPY 1/> REGIONS: CPT | Mod: GO | Performed by: OCCUPATIONAL THERAPIST

## 2023-03-01 PROCEDURE — 97026 INFRARED THERAPY: CPT | Mod: GO | Performed by: OCCUPATIONAL THERAPIST

## 2023-03-06 ENCOUNTER — HOSPITAL ENCOUNTER (OUTPATIENT)
Dept: OCCUPATIONAL THERAPY | Facility: CLINIC | Age: 58
Setting detail: THERAPIES SERIES
Discharge: HOME | End: 2023-03-06
Attending: FAMILY MEDICINE
Payer: COMMERCIAL

## 2023-03-06 PROCEDURE — 97035 APP MDLTY 1+ULTRASOUND EA 15: CPT | Mod: GO | Performed by: OCCUPATIONAL THERAPIST

## 2023-03-06 PROCEDURE — 97026 INFRARED THERAPY: CPT | Mod: GO | Performed by: OCCUPATIONAL THERAPIST

## 2023-03-06 PROCEDURE — 97140 MANUAL THERAPY 1/> REGIONS: CPT | Mod: GO | Performed by: OCCUPATIONAL THERAPIST

## 2023-03-08 ENCOUNTER — HOSPITAL ENCOUNTER (OUTPATIENT)
Dept: OCCUPATIONAL THERAPY | Facility: CLINIC | Age: 58
Setting detail: THERAPIES SERIES
Discharge: HOME | End: 2023-03-08
Attending: FAMILY MEDICINE
Payer: COMMERCIAL

## 2023-03-08 PROCEDURE — 97035 APP MDLTY 1+ULTRASOUND EA 15: CPT | Mod: GO | Performed by: OCCUPATIONAL THERAPIST

## 2023-03-08 PROCEDURE — 97026 INFRARED THERAPY: CPT | Mod: GO | Performed by: OCCUPATIONAL THERAPIST

## 2023-03-08 PROCEDURE — 97140 MANUAL THERAPY 1/> REGIONS: CPT | Mod: GO | Performed by: OCCUPATIONAL THERAPIST

## 2023-03-13 ENCOUNTER — HOSPITAL ENCOUNTER (OUTPATIENT)
Dept: OCCUPATIONAL THERAPY | Facility: CLINIC | Age: 58
Setting detail: THERAPIES SERIES
Discharge: HOME | End: 2023-03-13
Attending: FAMILY MEDICINE
Payer: COMMERCIAL

## 2023-03-13 PROCEDURE — 97140 MANUAL THERAPY 1/> REGIONS: CPT | Mod: GO | Performed by: OCCUPATIONAL THERAPIST

## 2023-03-13 PROCEDURE — 97035 APP MDLTY 1+ULTRASOUND EA 15: CPT | Mod: GO | Performed by: OCCUPATIONAL THERAPIST

## 2023-03-13 PROCEDURE — 97026 INFRARED THERAPY: CPT | Mod: GO | Performed by: OCCUPATIONAL THERAPIST

## 2023-03-15 ENCOUNTER — HOSPITAL ENCOUNTER (OUTPATIENT)
Dept: OCCUPATIONAL THERAPY | Facility: CLINIC | Age: 58
Setting detail: THERAPIES SERIES
Discharge: HOME | End: 2023-03-15
Attending: FAMILY MEDICINE
Payer: COMMERCIAL

## 2023-03-15 PROCEDURE — 97140 MANUAL THERAPY 1/> REGIONS: CPT | Mod: GO | Performed by: OCCUPATIONAL THERAPIST

## 2023-03-15 PROCEDURE — 97026 INFRARED THERAPY: CPT | Mod: GO | Performed by: OCCUPATIONAL THERAPIST

## 2023-03-15 PROCEDURE — 97035 APP MDLTY 1+ULTRASOUND EA 15: CPT | Mod: GO | Performed by: OCCUPATIONAL THERAPIST

## 2023-03-15 PROCEDURE — 97110 THERAPEUTIC EXERCISES: CPT | Mod: GO | Performed by: OCCUPATIONAL THERAPIST

## 2023-03-20 ENCOUNTER — MYC MEDICAL ADVICE (OUTPATIENT)
Dept: FAMILY MEDICINE | Facility: CLINIC | Age: 58
End: 2023-03-20

## 2023-03-20 ENCOUNTER — LAB (OUTPATIENT)
Dept: LAB | Facility: CLINIC | Age: 58
End: 2023-03-20
Payer: COMMERCIAL

## 2023-03-20 DIAGNOSIS — Z00.00 ROUTINE GENERAL MEDICAL EXAMINATION AT A HEALTH CARE FACILITY: ICD-10-CM

## 2023-03-20 DIAGNOSIS — Z13.1 SCREENING FOR DIABETES MELLITUS: ICD-10-CM

## 2023-03-20 DIAGNOSIS — E78.5 HYPERLIPIDEMIA LDL GOAL <130: ICD-10-CM

## 2023-03-20 LAB
CHOLEST SERPL-MCNC: 311 MG/DL
FASTING STATUS PATIENT QL REPORTED: YES
GLUCOSE SERPL-MCNC: 90 MG/DL (ref 70–99)
HDLC SERPL-MCNC: 36 MG/DL
LDLC SERPL CALC-MCNC: ABNORMAL MG/DL
LDLC SERPL DIRECT ASSAY-MCNC: 107 MG/DL
NONHDLC SERPL-MCNC: 275 MG/DL
TRIGL SERPL-MCNC: 873 MG/DL

## 2023-03-20 PROCEDURE — 80061 LIPID PANEL: CPT

## 2023-03-20 PROCEDURE — 83721 ASSAY OF BLOOD LIPOPROTEIN: CPT | Mod: 59

## 2023-03-20 PROCEDURE — 36415 COLL VENOUS BLD VENIPUNCTURE: CPT

## 2023-03-20 PROCEDURE — 82947 ASSAY GLUCOSE BLOOD QUANT: CPT

## 2023-03-20 NOTE — TELEPHONE ENCOUNTER
Patient has appointment tomorrow 3/21 for a pap wants to know if she should re-schedule.    Connie Wagner PSC on 3/20/2023 at 1:25 PM

## 2023-03-21 DIAGNOSIS — E78.2 MIXED HYPERLIPIDEMIA: Primary | ICD-10-CM

## 2023-03-21 DIAGNOSIS — E78.1 HYPERTRIGLYCERIDEMIA: ICD-10-CM

## 2023-03-21 RX ORDER — FENOFIBRATE 48 MG/1
48 TABLET, COATED ORAL DAILY
Qty: 90 TABLET | Refills: 1 | Status: SHIPPED | OUTPATIENT
Start: 2023-03-21 | End: 2023-09-21

## 2023-03-30 ENCOUNTER — OFFICE VISIT (OUTPATIENT)
Dept: FAMILY MEDICINE | Facility: CLINIC | Age: 58
End: 2023-03-30
Payer: COMMERCIAL

## 2023-03-30 VITALS
TEMPERATURE: 99.1 F | BODY MASS INDEX: 27.14 KG/M2 | HEIGHT: 66 IN | RESPIRATION RATE: 16 BRPM | DIASTOLIC BLOOD PRESSURE: 88 MMHG | SYSTOLIC BLOOD PRESSURE: 138 MMHG | HEART RATE: 76 BPM | WEIGHT: 168.9 LBS

## 2023-03-30 DIAGNOSIS — I10 ESSENTIAL HYPERTENSION: Primary | ICD-10-CM

## 2023-03-30 DIAGNOSIS — E78.2 MIXED HYPERLIPIDEMIA: ICD-10-CM

## 2023-03-30 DIAGNOSIS — Z12.4 ENCOUNTER FOR SCREENING FOR CERVICAL CANCER: ICD-10-CM

## 2023-03-30 DIAGNOSIS — Z12.4 CERVICAL CANCER SCREENING: ICD-10-CM

## 2023-03-30 DIAGNOSIS — N95.1 VASOMOTOR SYMPTOMS DUE TO MENOPAUSE: ICD-10-CM

## 2023-03-30 DIAGNOSIS — E78.1 HYPERTRIGLYCERIDEMIA: ICD-10-CM

## 2023-03-30 PROCEDURE — 99214 OFFICE O/P EST MOD 30 MIN: CPT | Performed by: NURSE PRACTITIONER

## 2023-03-30 PROCEDURE — G0145 SCR C/V CYTO,THINLAYER,RESCR: HCPCS | Performed by: NURSE PRACTITIONER

## 2023-03-30 PROCEDURE — 87624 HPV HI-RISK TYP POOLED RSLT: CPT | Performed by: NURSE PRACTITIONER

## 2023-03-30 RX ORDER — ESTRADIOL 1 MG/1
1 TABLET ORAL DAILY
Qty: 90 TABLET | Refills: 3 | Status: SHIPPED | OUTPATIENT
Start: 2023-03-30 | End: 2023-08-07

## 2023-03-30 RX ORDER — PROGESTERONE 100 MG/1
100 CAPSULE ORAL DAILY
Qty: 90 CAPSULE | Refills: 3 | Status: SHIPPED | OUTPATIENT
Start: 2023-03-30 | End: 2023-06-19

## 2023-03-30 ASSESSMENT — PAIN SCALES - GENERAL: PAINLEVEL: NO PAIN (0)

## 2023-03-30 NOTE — PROGRESS NOTES
"  Assessment & Plan     Essential hypertension  New diagnosis. BMP ordered.  Follow up in 1-2 weeks with RN.  Continue to monitor blood pressure at home. Home readings elevated - patient will defer treatment today.  Follow-up with Rn with blood pressure machine.  If still elevated recommend starting lisinopril 5 mg once daily  Continue exercise, weight loss and dietary changes including avoiding alcohol.    - Basic metabolic panel  (Ca, Cl, CO2, Creat, Gluc, K, Na, BUN)    Vasomotor symptoms due to menopause  Improved.  Refilled.  Mammogram yearly.    - estradiol (ESTRACE) 1 MG tablet  Dispense: 90 tablet; Refill: 3  - progesterone (PROMETRIUM) 100 MG capsule  Dispense: 90 capsule; Refill: 3    Encounter for screening for cervical cancer     - Pap Screen with HPV - recommended age 30 - 65 years    Hypertriglyceridemia     - Lipid panel reflex to direct LDL Fasting    Mixed hyperlipidemia     - Lipid panel reflex to direct LDL Fasting    Cervical cancer screening               BMI:   Estimated body mass index is 27.68 kg/m  as calculated from the following:    Height as of this encounter: 1.664 m (5' 5.5\").    Weight as of this encounter: 76.6 kg (168 lb 14.4 oz).       See Patient Instructions    Leola Reyna NP  [facility] AMBER Kang is a 57 year old, presenting for the following health issues:  Recheck Medication    History of Present Illness       Hypertension: She presents for follow up of hypertension.  She does check blood pressure  regularly outside of the clinic. Outside blood pressures have been over 140/90. She follows a low salt diet.     Reason for visit:  Could not perform cervical exam at [facility] wellness check and review labs    She eats 2-3 servings of fruits and vegetables daily.She consumes 0 sweetened beverage(s) daily.She exercises with enough effort to increase her heart rate 60 or more minutes per day.  She exercises with enough effort to increase her " "heart rate 3 or less days per week.   She is taking medications regularly.       Hyperlipidemia Follow-Up      Are you regularly taking any medication or supplement to lower your cholesterol?   Yes-fenofibrate 48 mg    Are you having muscle aches or other side effects that you think could be caused by your cholesterol lowering medication?  No      Medication Followup of estradiol 1 mg    Taking Medication as prescribed: yes    Side Effects:  None    Medication Helping Symptoms:  yes      Medication Followup of pregesterone 100 mg    Taking Medication as prescribed: yes    Side Effects:  None    Medication Helping Symptoms:  yes  Initially had water retention but now improved.  Hot flashes improved. Moods normal.  Periods are regular but farther apart.  LMP 1 week ago  Started 1 month ago.     Transitioned to HRT from oral contraceptive pills     Monitoring blood pressures at home and readings have been high 130's-140's/80's.  Cut back on alcohol intake, dietary changes.  Taking Fenofibrate for hypertriglyceridemia.  Has lost weight since Dec 2022.    Review of Systems   Constitutional, HEENT, cardiovascular, pulmonary, GI, , musculoskeletal, neuro, skin, endocrine and psych systems are negative, except as otherwise noted.      Objective    /82 (BP Location: Left arm, Patient Position: Sitting, Cuff Size: Adult Regular)   Pulse 76   Temp 99.1  F (37.3  C) (Tympanic)   Resp 16   Ht 1.664 m (5' 5.5\")   Wt 76.6 kg (168 lb 14.4 oz)   LMP 03/23/2023 (Approximate)   BMI 27.68 kg/m    Body mass index is 27.68 kg/m .   BP Readings from Last 6 Encounters:   03/30/23 130/82   02/21/23 (!) 176/97   02/07/23 (!) 149/98   10/15/21 (!) 161/91   09/23/21 126/80   06/04/20 131/79     Wt Readings from Last 4 Encounters:   03/30/23 76.6 kg (168 lb 14.4 oz)   02/21/23 (P) 79.4 kg (175 lb)   02/07/23 79.8 kg (175 lb 14.4 oz)   10/15/21 76.7 kg (169 lb)       Physical Exam   GENERAL: healthy, alert and no distress  NECK: " no adenopathy, no asymmetry, masses, or scars and thyroid normal to palpation  RESP: lungs clear to auscultation - no rales, rhonchi or wheezes  CV: regular rate and rhythm, normal S1 S2, no S3 or S4, no murmur, click or rub, no peripheral edema and peripheral pulses strong  ABDOMEN: soft, nontender, no hepatosplenomegaly, no masses and bowel sounds normal   (female): normal female external genitalia, normal urethral meatus , vaginal mucosa pink, moist, well rugated and normal cervix, adnexae, and uterus without masses.  MS: no gross musculoskeletal defects noted, no edema

## 2023-04-04 LAB
BKR LAB AP GYN ADEQUACY: NORMAL
BKR LAB AP GYN INTERPRETATION: NORMAL
BKR LAB AP HPV REFLEX: NORMAL
BKR LAB AP PREVIOUS ABNORMAL: NORMAL
PATH REPORT.COMMENTS IMP SPEC: NORMAL
PATH REPORT.COMMENTS IMP SPEC: NORMAL
PATH REPORT.RELEVANT HX SPEC: NORMAL

## 2023-04-06 ENCOUNTER — TELEPHONE (OUTPATIENT)
Dept: FAMILY MEDICINE | Facility: CLINIC | Age: 58
End: 2023-04-06

## 2023-04-06 ENCOUNTER — ALLIED HEALTH/NURSE VISIT (OUTPATIENT)
Dept: FAMILY MEDICINE | Facility: CLINIC | Age: 58
End: 2023-04-06
Payer: COMMERCIAL

## 2023-04-06 VITALS
RESPIRATION RATE: 16 BRPM | OXYGEN SATURATION: 98 % | DIASTOLIC BLOOD PRESSURE: 82 MMHG | HEART RATE: 82 BPM | SYSTOLIC BLOOD PRESSURE: 138 MMHG

## 2023-04-06 DIAGNOSIS — I10 ESSENTIAL HYPERTENSION: Primary | ICD-10-CM

## 2023-04-06 PROCEDURE — 99207 PR NO CHARGE NURSE ONLY: CPT

## 2023-04-06 NOTE — TELEPHONE ENCOUNTER
Leola    Pearl Dinah Gibson is a 57 year old year old patient who comes in today for a Blood Pressure check because of ongoing blood pressure monitoring.  Pt not on BP meds.  OV 3/30: /82 p76. Provider recommended to f/u with RN visit & bring home BP machine     Vital Signs as repeated by RN   138/86 p82  138/82 p82     *pt brought in home BP machine:   155/88 p79.     2 min later: 130/83 p78   Thinks machine may be 5 years old. Pt will look into getting new BP machine.     Patient is taking medication as prescribed. Not on BP meds  Patient  tolerating medications well.  Patient is monitoring Blood Pressure at home.  140-144/85-88  Current complaints: none  Disposition:  Routed to provider for review.  Preferred pharmacy: [facility] RN

## 2023-04-06 NOTE — PROGRESS NOTES
Pearl Gibson is a 57 year old year old patient who comes in today for a Blood Pressure check because of ongoing blood pressure monitoring.  Pt not on BP meds.  OV 3/30: /82 p76. Provider recommended to f/u with RN visit & bring home BP machine    Vital Signs as repeated by RN   138/86 p82  138/82 p82    *pt brought in home BP machine:   155/88 p79.     2 min later: 130/83 p78   Thinks machine may be 5 years old. Pt will look into getting new BP machine.    Patient is taking medication as prescribed. Not on BP meds  Patient  tolerating medications well.  Patient is monitoring Blood Pressure at home.  140-144/85-88  Current complaints: none  Disposition:  Routed to provider for review.  Preferred pharmacy: [facility] RN

## 2023-04-07 ENCOUNTER — PATIENT OUTREACH (OUTPATIENT)
Dept: FAMILY MEDICINE | Facility: CLINIC | Age: 58
End: 2023-04-07
Payer: COMMERCIAL

## 2023-04-07 DIAGNOSIS — R87.810 CERVICAL HIGH RISK HPV (HUMAN PAPILLOMAVIRUS) TEST POSITIVE: ICD-10-CM

## 2023-04-07 LAB
HUMAN PAPILLOMA VIRUS 16 DNA: NEGATIVE
HUMAN PAPILLOMA VIRUS 18 DNA: NEGATIVE
HUMAN PAPILLOMA VIRUS FINAL DIAGNOSIS: ABNORMAL
HUMAN PAPILLOMA VIRUS OTHER HR: POSITIVE

## 2023-06-05 NOTE — PROGRESS NOTES
03/15/23 1100   Appointment Info   Treating Provider Priscila Arcos OTR/L CHT   Visits Used 7   Quick Add  Hand   Goals   Hand Eval Goals 1;2;3   Subjective Report   Subjective Report up all night with pain. has tried splinting but hasn't helped much in   Patient Reported % Functional Improvement 0   Initial Pain level 5/10  (at worst)   Current Pain level 1/10  (has not exercised yet)   Objective Measures   Objective Measures Objective Measure 1   Objective Measure 1   Objective Measure palpation to lateral elbow distal to epicondyle   Details moderate pain   OT Modalities   OT Modalities Ultrasound ;Infrared   Infrared       Skilled Intervention to enhance tissue repair   Treatment Detail 4j/cm2 lateral elbow   Ultrasound   Ultrasound, Minutes (62850) 8 Minutes   Skilled Intervention to increase circulation   Treatment Detail 3mhz, 50% pulsed , lateral elbow at 1.0w/cm2   Treatment Interventions (OT)   Vestibular Interventions Self Care/Home Management;Therapeutic Procedure/Exercise;Manual Therapy   Therapeutic Procedure/Exercise       Skilled Intervention education for HEP   Treatment Detail , Extrinsic stretches- demonstration and modified to keep elbow flexed   Manual Therapy       Skilled Intervention to enhance flexibility, blood flow   Treatment Detail augmented STM with Colleen Sha tools and friction massage to lateral elbow, flexors, extensors, Triceps, and Biceps, Tiger Balm, home manual friction massage and muscle release techniques demonstrated   Education   Learner/Method Patient   Readiness Eager   Plan   Home program PTRX,- extrinsic stretching, artisan moving stretch, activity modification, heat, counterforce brace wear with activity, self muscle release, friction massage   Plan for next session see how modified ex are going add eccentrics next visit if tolerated   Homework o  g  i  n  p  t  r  x.o  r  g  /  e  n  /  f  v  v  m  e  k  d  d  2  e   Hand   Referring Physician Dr. Vimal Bob  Diagnosis Right Lateral Epicondylitis, Chronic  right elbow pain   Orders Evaluate And Treat As Indicated   Hand Goal 1   Goal Identifier home program   Goal Description Patient to be independent with home program, not needing more than 15% verbal cuing from therapist   Goal Progress reviewed today- still needs about 30% cuing   Target Date 03/15/23   Date Met 03/13/23   Hand Goal 2   Goal Identifier sleeping   Goal Description Patient to score 4 on UEFI to be able to sleep without pain   Goal Progress 2 currently   Target Date 03/28/23   Hand Goal 3   Goal Identifier lifting weights   Goal Description Patient to resume weight lifting with minimal difficulty   Goal Progress avoiding at this time   Target Date 04/05/23   RETIRED Equipment   Assessments Completed progress slow and patient more irritated- maybe overdoing ex- so ex modified today   General Information/History   Start Of Care Date 02/22/23   Onset date of current episode/exacerbation 06/21/21  (approximate)         DISCHARGE  Reason for Discharge: Patient did not schedule further appointments.    Equipment Issued:     Discharge Plan: Patient to continue home program.    Referring Provider:  Brock Celis

## 2023-06-06 ENCOUNTER — OFFICE VISIT (OUTPATIENT)
Dept: DERMATOLOGY | Facility: CLINIC | Age: 58
End: 2023-06-06
Attending: NURSE PRACTITIONER
Payer: COMMERCIAL

## 2023-06-06 DIAGNOSIS — L81.4 LENTIGO: ICD-10-CM

## 2023-06-06 DIAGNOSIS — L82.1 SEBORRHEIC KERATOSIS: Primary | ICD-10-CM

## 2023-06-06 DIAGNOSIS — D22.9 MULTIPLE BENIGN NEVI: ICD-10-CM

## 2023-06-06 DIAGNOSIS — D18.01 CHERRY ANGIOMA: ICD-10-CM

## 2023-06-06 PROCEDURE — 99203 OFFICE O/P NEW LOW 30 MIN: CPT | Performed by: PHYSICIAN ASSISTANT

## 2023-06-06 ASSESSMENT — PAIN SCALES - GENERAL: PAINLEVEL: NO PAIN (0)

## 2023-06-06 NOTE — LETTER
6/6/2023         RE: Pearl Gibson  [address] [facility] [address]        Dear Colleague,    Thank you for referring your patient, Pearl Gibson, to the [facility]. Please see a copy of my visit note below.    Pearl Gibson is an extremely pleasant 57 year old year old female patient here today for skin check. She notes some discolored area after previous injuries. She notes she notes not to pick her skin because discoloration on legs persists for a while. She denies blistering sunburns and rare tanning bed usage.  Patient has no other skin complaints today.  Remainder of the HPI, Meds, PMH, Allergies, FH, and SH was reviewed in chart.    Pertinent Hx:   No personal history of skin cancer.   Past Medical History:   Diagnosis Date     Cellulitis and abscess of unspecified site 1-4/2005     Essential hypertension 3/30/2023     Hypoglycemia 1/27/2006    Problem list name updated by automated process. Provider to review     Lateral epicondylitis of right elbow 9/23/2021     Need for prophylactic hormone replacement therapy (postmenopausal)     birth control       Past Surgical History:   Procedure Laterality Date     BIOPSY BREAST Left 2019     SURGICAL HISTORY OF -   10/16/1997    D&C        Family History   Problem Relation Age of Onset     Arthritis Mother      Cerebrovascular Disease Mother      Cancer Father         SKIN     Heart Disease Father         heart surgery - ?valve     Gastrointestinal Disease Father         colon polyps     Lipids Sister      Alcohol/Drug Brother      Allergies Sister      Genitourinary Problems Sister         KIDNEY STONES     Obesity Sister      Lung Cancer Sister 68        stage 4     Obesity Sister      Cancer - colorectal Maternal Grandmother      Depression Daughter      Anxiety Disorder Daughter      Breast Cancer No family hx of        Social History     Socioeconomic History     Marital status:      Spouse name: Not on  file     Number of children: Not on file     Years of education: Not on file     Highest education level: Not on file   Occupational History     Not on file   Tobacco Use     Smoking status: Never     Smokeless tobacco: Never     Tobacco comments:     none in home   Vaping Use     Vaping status: Never Used   Substance and Sexual Activity     Alcohol use: Yes     Comment: 2 drinks a week     Drug use: No     Sexual activity: Yes     Partners: Male     Birth control/protection: Male Surgical   Other Topics Concern     Parent/sibling w/ CABG, MI or angioplasty before 65F 55M? No   Social History Narrative     Not on file     Social Determinants of Health     Financial Resource Strain: Not on file   Food Insecurity: Not on file   Transportation Needs: Not on file   Physical Activity: Not on file   Stress: Not on file   Social Connections: Not on file   Intimate Partner Violence: Not on file   Housing Stability: Not on file       Outpatient Encounter Medications as of 6/6/2023   Medication Sig Dispense Refill     CALTRATE 600 + D 600-200 MG-IU OR TABS 1 TABLET DAILY       Cholecalciferol (VITAMIN D3) 2000 UNITS TABS Take  by mouth. 1 tablet daily       estradiol (ESTRACE) 1 MG tablet Take 1 tablet (1 mg) by mouth daily 90 tablet 3     fenofibrate (TRICOR) 48 MG tablet Take 1 tablet (48 mg) by mouth daily 90 tablet 1     hydrocortisone (CORTAID) 1 % external cream Apply topically 2 times daily as needed 30 g 1     Multiple Vitamins-Minerals (MULTIVITAMIN OR) Take  by mouth. 1 tablet daily       OMEGA-3 CAPS   OR 2 tablets daily  0     polyethylene glycol (MIRALAX) 17 GM/SCOOP powder Take 17 g (1 capful) by mouth daily 578 g 9     progesterone (PROMETRIUM) 100 MG capsule Take 1 capsule (100 mg) by mouth daily 90 capsule 3     Psyllium (METAMUCIL PO)        VITAMIN C 500 MG OR TABS 1 TABLET TWICE DAILY       No facility-administered encounter medications on file as of 6/6/2023.             O:   NAD, WDWN, Alert & Oriented,  Mood & Affect wnl, Vitals stable   Here today alone   There were no vitals taken for this visit.   General appearance normal   Vitals stable   Alert, oriented and in no acute distress     Stuck on papules and brown macules on trunk and ext   Red papules on trunk  Brown papules and macules with regular pigment network and borders on torso and extremities     The remainder of skin exam is normal     Eyes: Conjunctivae/lids:Normal     ENT: Lips: normal    MSK:Normal    Cardiovascular: peripheral edema none    Pulm: Breathing Normal    Neuro/Psych: Orientation:Alert and Orientedx3 ; Mood/Affect:normal     A/P:  1. Seborrheic keratosis, lentigo, angioma, benign nevi   It was a pleasure speaking to Pearl Gibson today.  BENIGN LESIONS DISCUSSED WITH PATIENT:  I discussed the specifics of tumor, prognosis, and genetics of benign lesions.  I explained that treatment of these lesions would be purely cosmetic and not medically neccessary.  I discussed with patient different removal options including excision, cautery and /or laser.      Nature and genetics of benign skin lesions dicussed with patient.  Signs and Symptoms of skin cancer discussed with patient.  ABCDEs of melanoma reviewed with patient.  Patient encouraged to perform monthly skin exams.  UV precautions reviewed with patient.  Risks of non-melanoma skin cancer discussed with patient   Return to clinic in one year or sooner if needed.         Again, thank you for allowing me to participate in the care of your patient.        Sincerely,        Valerie Brown PA-C

## 2023-06-06 NOTE — PROGRESS NOTES
Pearl Gibson is an extremely pleasant 57 year old year old female patient here today for skin check. She notes some discolored area after previous injuries. She notes she notes not to pick her skin because discoloration on legs persists for a while. She denies blistering sunburns and rare tanning bed usage.  Patient has no other skin complaints today.  Remainder of the HPI, Meds, PMH, Allergies, FH, and SH was reviewed in chart.    Pertinent Hx:   No personal history of skin cancer.   Past Medical History:   Diagnosis Date     Cellulitis and abscess of unspecified site 1-4/2005     Essential hypertension 3/30/2023     Hypoglycemia 1/27/2006    Problem list name updated by automated process. Provider to review     Lateral epicondylitis of right elbow 9/23/2021     Need for prophylactic hormone replacement therapy (postmenopausal)     birth control       Past Surgical History:   Procedure Laterality Date     BIOPSY BREAST Left 2019     SURGICAL HISTORY OF -   10/16/1997    D&C        Family History   Problem Relation Age of Onset     Arthritis Mother      Cerebrovascular Disease Mother      Cancer Father         SKIN     Heart Disease Father         heart surgery - ?valve     Gastrointestinal Disease Father         colon polyps     Lipids Sister      Alcohol/Drug Brother      Allergies Sister      Genitourinary Problems Sister         KIDNEY STONES     Obesity Sister      Lung Cancer Sister 68        stage 4     Obesity Sister      Cancer - colorectal Maternal Grandmother      Depression Daughter      Anxiety Disorder Daughter      Breast Cancer No family hx of        Social History     Socioeconomic History     Marital status:      Spouse name: Not on file     Number of children: Not on file     Years of education: Not on file     Highest education level: Not on file   Occupational History     Not on file   Tobacco Use     Smoking status: Never     Smokeless tobacco: Never     Tobacco comments:     none  in home   Vaping Use     Vaping status: Never Used   Substance and Sexual Activity     Alcohol use: Yes     Comment: 2 drinks a week     Drug use: No     Sexual activity: Yes     Partners: Male     Birth control/protection: Male Surgical   Other Topics Concern     Parent/sibling w/ CABG, MI or angioplasty before 65F 55M? No   Social History Narrative     Not on file     Social Determinants of Health     Financial Resource Strain: Not on file   Food Insecurity: Not on file   Transportation Needs: Not on file   Physical Activity: Not on file   Stress: Not on file   Social Connections: Not on file   Intimate Partner Violence: Not on file   Housing Stability: Not on file       Outpatient Encounter Medications as of 6/6/2023   Medication Sig Dispense Refill     CALTRATE 600 + D 600-200 MG-IU OR TABS 1 TABLET DAILY       Cholecalciferol (VITAMIN D3) 2000 UNITS TABS Take  by mouth. 1 tablet daily       estradiol (ESTRACE) 1 MG tablet Take 1 tablet (1 mg) by mouth daily 90 tablet 3     fenofibrate (TRICOR) 48 MG tablet Take 1 tablet (48 mg) by mouth daily 90 tablet 1     hydrocortisone (CORTAID) 1 % external cream Apply topically 2 times daily as needed 30 g 1     Multiple Vitamins-Minerals (MULTIVITAMIN OR) Take  by mouth. 1 tablet daily       OMEGA-3 CAPS   OR 2 tablets daily  0     polyethylene glycol (MIRALAX) 17 GM/SCOOP powder Take 17 g (1 capful) by mouth daily 578 g 9     progesterone (PROMETRIUM) 100 MG capsule Take 1 capsule (100 mg) by mouth daily 90 capsule 3     Psyllium (METAMUCIL PO)        VITAMIN C 500 MG OR TABS 1 TABLET TWICE DAILY       No facility-administered encounter medications on file as of 6/6/2023.             O:   NAD, WDWN, Alert & Oriented, Mood & Affect wnl, Vitals stable   Here today alone   There were no vitals taken for this visit.   General appearance normal   Vitals stable   Alert, oriented and in no acute distress     Stuck on papules and brown macules on trunk and ext   Red papules on  trunk  Brown papules and macules with regular pigment network and borders on torso and extremities     The remainder of skin exam is normal     Eyes: Conjunctivae/lids:Normal     ENT: Lips: normal    MSK:Normal    Cardiovascular: peripheral edema none    Pulm: Breathing Normal    Neuro/Psych: Orientation:Alert and Orientedx3 ; Mood/Affect:normal     A/P:  1. Seborrheic keratosis, lentigo, angioma, benign nevi   It was a pleasure speaking to Pearl Gibson today.  BENIGN LESIONS DISCUSSED WITH PATIENT:  I discussed the specifics of tumor, prognosis, and genetics of benign lesions.  I explained that treatment of these lesions would be purely cosmetic and not medically neccessary.  I discussed with patient different removal options including excision, cautery and /or laser.      Nature and genetics of benign skin lesions dicussed with patient.  Signs and Symptoms of skin cancer discussed with patient.  ABCDEs of melanoma reviewed with patient.  Patient encouraged to perform monthly skin exams.  UV precautions reviewed with patient.  Risks of non-melanoma skin cancer discussed with patient   Return to clinic in one year or sooner if needed.

## 2023-06-06 NOTE — NURSING NOTE
Chief Complaint   Patient presents with     Skin Check     Left leg        There were no vitals filed for this visit.  Wt Readings from Last 1 Encounters:   03/30/23 76.6 kg (168 lb 14.4 oz)       Nani Wilkinson LPN .................6/6/2023

## 2023-06-19 DIAGNOSIS — N95.1 VASOMOTOR SYMPTOMS DUE TO MENOPAUSE: ICD-10-CM

## 2023-06-19 RX ORDER — PROGESTERONE 100 MG/1
100 CAPSULE ORAL DAILY
Qty: 5 CAPSULE | Refills: 0 | Status: SHIPPED | OUTPATIENT
Start: 2023-06-19 | End: 2023-08-07

## 2023-06-19 NOTE — TELEPHONE ENCOUNTER
Medication Question or Refill        What medication are you calling about (include dose and sig)?: patient is currently on vacation forget med at home requesting 5 tablet be called to pharmacy on vacation.     bryanna jhaveri       Controlled Substance Agreement on file:   CSA -- Patient Level:    CSA: None found at the patient level.       Who prescribed the medication?: noemi    Do you need a refill? Short supply called to pharmacy on vacation.       Could we send this information to you in [employer] or would you prefer to receive a phone call?:   Patient would prefer a phone call   Okay to leave a detailed message?: Yes at Home number on file [phone] (home)

## 2023-08-05 DIAGNOSIS — N95.1 VASOMOTOR SYMPTOMS DUE TO MENOPAUSE: ICD-10-CM

## 2023-08-06 DIAGNOSIS — N95.1 VASOMOTOR SYMPTOMS DUE TO MENOPAUSE: ICD-10-CM

## 2023-08-07 RX ORDER — ESTRADIOL 1 MG/1
1 TABLET ORAL DAILY
Qty: 90 TABLET | Refills: 1 | Status: SHIPPED | OUTPATIENT
Start: 2023-08-07 | End: 2024-05-06

## 2023-08-07 RX ORDER — PROGESTERONE 100 MG/1
100 CAPSULE ORAL DAILY
Qty: 90 CAPSULE | Refills: 2 | Status: SHIPPED | OUTPATIENT
Start: 2023-08-07 | End: 2024-05-06

## 2023-08-07 NOTE — TELEPHONE ENCOUNTER
Prescription approved per [facility] Refill Protocol.  Last mammogram 9/8/2022, so within 2 year timeframe.   Refilled to next March when due for a visit.    Monica Dave RN  [facility]

## 2023-09-10 ENCOUNTER — HEALTH MAINTENANCE LETTER (OUTPATIENT)
Age: 58
End: 2023-09-10

## 2023-09-21 DIAGNOSIS — E78.2 MIXED HYPERLIPIDEMIA: ICD-10-CM

## 2023-09-21 DIAGNOSIS — E78.1 HYPERTRIGLYCERIDEMIA: ICD-10-CM

## 2023-09-21 RX ORDER — FENOFIBRATE 48 MG/1
TABLET, COATED ORAL
Qty: 90 TABLET | Refills: 1 | Status: SHIPPED | OUTPATIENT
Start: 2023-09-21 | End: 2024-05-10

## 2023-09-27 ENCOUNTER — HOSPITAL ENCOUNTER (OUTPATIENT)
Dept: MAMMOGRAPHY | Facility: CLINIC | Age: 58
Discharge: HOME | End: 2023-09-27
Attending: NURSE PRACTITIONER | Admitting: NURSE PRACTITIONER
Payer: COMMERCIAL

## 2023-09-27 DIAGNOSIS — Z12.31 VISIT FOR SCREENING MAMMOGRAM: ICD-10-CM

## 2023-09-27 PROCEDURE — 77067 SCR MAMMO BI INCL CAD: CPT

## 2024-03-14 ENCOUNTER — PATIENT OUTREACH (OUTPATIENT)
Dept: FAMILY MEDICINE | Facility: CLINIC | Age: 59
End: 2024-03-14
Payer: COMMERCIAL

## 2024-03-14 PROBLEM — R87.810 CERVICAL HIGH RISK HPV (HUMAN PAPILLOMAVIRUS) TEST POSITIVE: Status: ACTIVE | Noted: 2023-03-30

## 2024-05-06 DIAGNOSIS — N95.1 VASOMOTOR SYMPTOMS DUE TO MENOPAUSE: ICD-10-CM

## 2024-05-06 RX ORDER — ESTRADIOL 1 MG/1
1 TABLET ORAL DAILY
Qty: 90 TABLET | Refills: 0 | Status: SHIPPED | OUTPATIENT
Start: 2024-05-06 | End: 2024-05-10

## 2024-05-06 RX ORDER — PROGESTERONE 100 MG/1
100 CAPSULE ORAL DAILY
Qty: 90 CAPSULE | Refills: 0 | Status: SHIPPED | OUTPATIENT
Start: 2024-05-06 | End: 2024-05-10

## 2024-05-08 ENCOUNTER — DOCUMENTATION ONLY (OUTPATIENT)
Dept: LAB | Facility: CLINIC | Age: 59
End: 2024-05-08

## 2024-05-08 DIAGNOSIS — I10 ESSENTIAL HYPERTENSION: Primary | ICD-10-CM

## 2024-05-08 DIAGNOSIS — E78.5 HYPERLIPIDEMIA LDL GOAL <130: ICD-10-CM

## 2024-05-08 NOTE — PROGRESS NOTES
Pt is coming for labs 5/10 for her annual lab work  Please put in labs if needed  Thank you  [place] [facility] lab

## 2024-05-09 SDOH — HEALTH STABILITY: PHYSICAL HEALTH: ON AVERAGE, HOW MANY DAYS PER WEEK DO YOU ENGAGE IN MODERATE TO STRENUOUS EXERCISE (LIKE A BRISK WALK)?: 3 DAYS

## 2024-05-09 SDOH — HEALTH STABILITY: PHYSICAL HEALTH: ON AVERAGE, HOW MANY MINUTES DO YOU ENGAGE IN EXERCISE AT THIS LEVEL?: 60 MIN

## 2024-05-09 ASSESSMENT — SOCIAL DETERMINANTS OF HEALTH (SDOH): HOW OFTEN DO YOU GET TOGETHER WITH FRIENDS OR RELATIVES?: ONCE A WEEK

## 2024-05-10 ENCOUNTER — LAB (OUTPATIENT)
Dept: LAB | Facility: CLINIC | Age: 59
End: 2024-05-10
Payer: COMMERCIAL

## 2024-05-10 ENCOUNTER — MYC MEDICAL ADVICE (OUTPATIENT)
Dept: FAMILY MEDICINE | Facility: CLINIC | Age: 59
End: 2024-05-10

## 2024-05-10 ENCOUNTER — OFFICE VISIT (OUTPATIENT)
Dept: FAMILY MEDICINE | Facility: CLINIC | Age: 59
End: 2024-05-10
Payer: COMMERCIAL

## 2024-05-10 VITALS
DIASTOLIC BLOOD PRESSURE: 72 MMHG | SYSTOLIC BLOOD PRESSURE: 128 MMHG | RESPIRATION RATE: 16 BRPM | OXYGEN SATURATION: 99 % | WEIGHT: 173.1 LBS | BODY MASS INDEX: 27.82 KG/M2 | TEMPERATURE: 98 F | HEART RATE: 73 BPM | HEIGHT: 66 IN

## 2024-05-10 DIAGNOSIS — E78.1 HYPERTRIGLYCERIDEMIA: ICD-10-CM

## 2024-05-10 DIAGNOSIS — I10 ESSENTIAL HYPERTENSION: ICD-10-CM

## 2024-05-10 DIAGNOSIS — N95.1 MENOPAUSAL SYNDROME (HOT FLASHES): ICD-10-CM

## 2024-05-10 DIAGNOSIS — E78.2 MIXED HYPERLIPIDEMIA: ICD-10-CM

## 2024-05-10 DIAGNOSIS — E78.2 MIXED HYPERLIPIDEMIA: Primary | ICD-10-CM

## 2024-05-10 DIAGNOSIS — Z00.00 ROUTINE GENERAL MEDICAL EXAMINATION AT A HEALTH CARE FACILITY: Primary | ICD-10-CM

## 2024-05-10 DIAGNOSIS — Z12.4 CERVICAL CANCER SCREENING: ICD-10-CM

## 2024-05-10 DIAGNOSIS — R87.810 CERVICAL HIGH RISK HPV (HUMAN PAPILLOMAVIRUS) TEST POSITIVE: ICD-10-CM

## 2024-05-10 DIAGNOSIS — R40.0 DAYTIME SLEEPINESS: ICD-10-CM

## 2024-05-10 DIAGNOSIS — E78.5 HYPERLIPIDEMIA LDL GOAL <130: ICD-10-CM

## 2024-05-10 DIAGNOSIS — N95.1 VASOMOTOR SYMPTOMS DUE TO MENOPAUSE: ICD-10-CM

## 2024-05-10 PROBLEM — R03.0 ELEVATED BLOOD PRESSURE READING WITHOUT DIAGNOSIS OF HYPERTENSION: Status: RESOLVED | Noted: 2023-02-07 | Resolved: 2024-05-10

## 2024-05-10 LAB
ANION GAP SERPL CALCULATED.3IONS-SCNC: 12 MMOL/L (ref 7–15)
BUN SERPL-MCNC: 13.9 MG/DL (ref 6–20)
CALCIUM SERPL-MCNC: 9.2 MG/DL (ref 8.6–10)
CHLORIDE SERPL-SCNC: 103 MMOL/L (ref 98–107)
CHOLEST SERPL-MCNC: 352 MG/DL
CREAT SERPL-MCNC: 0.76 MG/DL (ref 0.51–0.95)
DEPRECATED HCO3 PLAS-SCNC: 24 MMOL/L (ref 22–29)
EGFRCR SERPLBLD CKD-EPI 2021: >90 ML/MIN/1.73M2
ERYTHROCYTE [DISTWIDTH] IN BLOOD BY AUTOMATED COUNT: 12.6 % (ref 10–15)
FASTING STATUS PATIENT QL REPORTED: YES
FASTING STATUS PATIENT QL REPORTED: YES
FERRITIN SERPL-MCNC: 258 NG/ML (ref 11–328)
GLUCOSE SERPL-MCNC: 95 MG/DL (ref 70–99)
HCT VFR BLD AUTO: 38.9 % (ref 35–47)
HDLC SERPL-MCNC: 40 MG/DL
HGB BLD-MCNC: 12.7 G/DL (ref 11.7–15.7)
IRON BINDING CAPACITY (ROCHE): 311 UG/DL (ref 240–430)
IRON SATN MFR SERPL: 38 % (ref 15–46)
IRON SERPL-MCNC: 118 UG/DL (ref 37–145)
LDLC SERPL CALC-MCNC: ABNORMAL MG/DL
LDLC SERPL DIRECT ASSAY-MCNC: 122 MG/DL
MCH RBC QN AUTO: 31.1 PG (ref 26.5–33)
MCHC RBC AUTO-ENTMCNC: 32.6 G/DL (ref 31.5–36.5)
MCV RBC AUTO: 95 FL (ref 78–100)
NONHDLC SERPL-MCNC: 312 MG/DL
PLATELET # BLD AUTO: 259 10E3/UL (ref 150–450)
POTASSIUM SERPL-SCNC: 3.9 MMOL/L (ref 3.4–5.3)
RBC # BLD AUTO: 4.09 10E6/UL (ref 3.8–5.2)
SODIUM SERPL-SCNC: 139 MMOL/L (ref 135–145)
TRIGL SERPL-MCNC: 885 MG/DL
TSH SERPL DL<=0.005 MIU/L-ACNC: 2.44 UIU/ML (ref 0.3–4.2)
VIT B12 SERPL-MCNC: 1082 PG/ML (ref 232–1245)
WBC # BLD AUTO: 4.6 10E3/UL (ref 4–11)

## 2024-05-10 PROCEDURE — 80048 BASIC METABOLIC PNL TOTAL CA: CPT

## 2024-05-10 PROCEDURE — 80061 LIPID PANEL: CPT

## 2024-05-10 PROCEDURE — 82607 VITAMIN B-12: CPT | Performed by: NURSE PRACTITIONER

## 2024-05-10 PROCEDURE — 84443 ASSAY THYROID STIM HORMONE: CPT | Performed by: NURSE PRACTITIONER

## 2024-05-10 PROCEDURE — 83721 ASSAY OF BLOOD LIPOPROTEIN: CPT | Mod: 59

## 2024-05-10 PROCEDURE — 83550 IRON BINDING TEST: CPT | Performed by: NURSE PRACTITIONER

## 2024-05-10 PROCEDURE — 99214 OFFICE O/P EST MOD 30 MIN: CPT | Mod: 25 | Performed by: NURSE PRACTITIONER

## 2024-05-10 PROCEDURE — 85027 COMPLETE CBC AUTOMATED: CPT | Performed by: NURSE PRACTITIONER

## 2024-05-10 PROCEDURE — 83540 ASSAY OF IRON: CPT | Performed by: NURSE PRACTITIONER

## 2024-05-10 PROCEDURE — 36415 COLL VENOUS BLD VENIPUNCTURE: CPT

## 2024-05-10 PROCEDURE — 82728 ASSAY OF FERRITIN: CPT | Performed by: NURSE PRACTITIONER

## 2024-05-10 PROCEDURE — 87624 HPV HI-RISK TYP POOLED RSLT: CPT | Performed by: NURSE PRACTITIONER

## 2024-05-10 PROCEDURE — 99396 PREV VISIT EST AGE 40-64: CPT | Performed by: NURSE PRACTITIONER

## 2024-05-10 PROCEDURE — G0145 SCR C/V CYTO,THINLAYER,RESCR: HCPCS | Performed by: NURSE PRACTITIONER

## 2024-05-10 RX ORDER — PROGESTERONE 100 MG/1
100 CAPSULE ORAL DAILY
Qty: 90 CAPSULE | Refills: 3 | Status: SHIPPED | OUTPATIENT
Start: 2024-05-10

## 2024-05-10 RX ORDER — FENOFIBRATE 54 MG/1
54 TABLET ORAL DAILY
Qty: 90 TABLET | Refills: 1 | Status: SHIPPED | OUTPATIENT
Start: 2024-05-10

## 2024-05-10 RX ORDER — ESTRADIOL 1 MG/1
1 TABLET ORAL DAILY
Qty: 90 TABLET | Refills: 3 | Status: SHIPPED | OUTPATIENT
Start: 2024-05-10

## 2024-05-10 RX ORDER — CETIRIZINE HYDROCHLORIDE 10 MG/1
10 TABLET ORAL DAILY
COMMUNITY

## 2024-05-10 ASSESSMENT — PAIN SCALES - GENERAL: PAINLEVEL: NO PAIN (0)

## 2024-05-10 NOTE — PROGRESS NOTES
"Preventive Care Visit  [facility]  Leola Reyna DNP, Family Medicine  May 10, 2024      Assessment & Plan     Routine general medical examination at a health care facility       Essential hypertension  Controlled - on medication.     Hypertriglyceridemia       Mixed hyperlipidemia       Vasomotor symptoms due to menopause   Likely in perimenopause. HRT controlling symptoms.    - progesterone (PROMETRIUM) 100 MG capsule  Dispense: 90 capsule; Refill: 3  - estradiol (ESTRACE) 1 MG tablet  Dispense: 90 tablet; Refill: 3    Cervical cancer screening     - Pap Screen with HPV - recommended age 30 - 65 years    Menopausal syndrome (hot flashes)       Daytime sleepiness  Uncertain etiology - ? If related to non restorative sleep vs other causes.  Labs today.    - CBC with platelets  - Iron and iron binding capacity  - Ferritin  - TSH with free T4 reflex  - Vitamin B12    Cervical high risk HPV (human papillomavirus) test positive   Recheck today due to last pap with + HR HPV      Patient has been advised of split billing requirements and indicates understanding: Yes         BMI  Estimated body mass index is 27.73 kg/m  as calculated from the following:    Height as of this encounter: 1.683 m (5' 6.25\").    Weight as of this encounter: 78.5 kg (173 lb 1.6 oz).       Counseling  Appropriate preventive services were discussed with this patient, including applicable screening as appropriate for fall prevention, nutrition, physical activity, Tobacco-use cessation, weight loss and cognition.  Checklist reviewing preventive services available has been given to the patient.  Reviewed patient's diet, addressing concerns and/or questions.   She is at risk for lack of exercise and has been provided with information to increase physical activity for the benefit of her well-being.   She is at risk for psychosocial distress and has been provided with information to reduce risk.       See Patient " Shauna Kang is a 58 year old, presenting for the following:  Physical        5/10/2024    10:34 AM   Additional Questions   Roomed by Allison MAY CMA   Accompanied by Self        Health Care Directive  Patient does not have a Health Care Directive or Living Will: Discussed advance care planning with patient; however, patient declined at this time.    HPI      Hyperlipidemia Follow-Up    Are you regularly taking any medication or supplement to lower your cholesterol?   No  Are you having muscle aches or other side effects that you think could be caused by your cholesterol lowering medication?  No    Hypertension Follow-up    Do you check your blood pressure regularly outside of the clinic? No   Are you following a low salt diet? Yes  Are your blood pressures ever more than 140 on the top number (systolic) OR more   than 90 on the bottom number (diastolic), for example 140/90? No      5/9/2024   General Health   How would you rate your overall physical health? Good   Feel stress (tense, anxious, or unable to sleep) Only a little   (!) STRESS CONCERN      5/9/2024   Nutrition   Three or more servings of calcium each day? (!) NO   Diet: Other   If other, please elaborate: Try to watch salt and carb intake   How many servings of fruit and vegetables per day? (!) 0-1   How many sweetened beverages each day? 0-1         5/9/2024   Exercise   Days per week of moderate/strenous exercise 3 days   Average minutes spent exercising at this level 60 min         5/9/2024   Social Factors   Frequency of gathering with friends or relatives Once a week   Worry food won't last until get money to buy more No   Food not last or not have enough money for food? No   Do you have housing?  Yes   Are you worried about losing your housing? No   Lack of transportation? No   Unable to get utilities (heat,electricity)? No         5/9/2024   Fall Risk   Fallen 2 or more times in the past year? No   Trouble with walking or  balance? No          5/9/2024   Dental   Dentist two times every year? Yes         5/9/2024   TB Screening   Were you born outside of the [place]? No         Today's PHQ-2 Score:       5/9/2024     1:39 PM   PHQ-2 ( 1999 [employer])   Q1: Little interest or pleasure in doing things 1   Q2: Feeling down, depressed or hopeless 1   PHQ-2 Score 2   Q1: Little interest or pleasure in doing things Several days   Q2: Feeling down, depressed or hopeless Several days   PHQ-2 Score 2           5/9/2024   Substance Use   Alcohol more than 3/day or more than 7/wk No   Do you use any other substances recreationally? No     Social History     Tobacco Use    Smoking status: Never    Smokeless tobacco: Never    Tobacco comments:     none in home   Vaping Use    Vaping status: Never Used   Substance Use Topics    Alcohol use: Yes     Comment: 2 drinks a week    Drug use: No          9/27/2023   LAST FHS-7 RESULTS   1st degree relative breast or ovarian cancer No   Any relative bilateral breast cancer No   Any male have breast cancer No   Any ONE woman have BOTH breast AND ovarian cancer No   Any woman with breast cancer before 50yrs No   2 or more relatives with breast AND/OR ovarian cancer No   2 or more relatives with breast AND/OR bowel cancer No      Mammogram Screening - Mammogram every 1-2 years updated in Health Maintenance based on mutual decision making          5/9/2024   One time HIV Screening   Previous HIV test? No         5/9/2024   STI Screening   New sexual partner(s) since last STI/HIV test? No     History of abnormal Pap smear: YES - updated in Problem List and Health Maintenance accordingly  Last 3 Pap and HPV Results:       Latest Ref Rng & Units 3/30/2023     1:59 PM 3/5/2020    10:20 AM 3/5/2020     9:55 AM   PAP / HPV   PAP  Negative for Intraepithelial Lesion or Malignancy (NILM)      PAP (Historical)    NIL    HPV 16 DNA Negative Negative  Negative     HPV 18 DNA Negative Negative  Negative     Other HR HPV Negative  Positive  Negative             Latest Ref Rng & Units 3/30/2023     1:59 PM 3/5/2020    10:20 AM 3/5/2020     9:55 AM   PAP / HPV   PAP  Negative for Intraepithelial Lesion or Malignancy (NILM)      PAP (Historical)    NIL    HPV 16 DNA Negative Negative  Negative     HPV 18 DNA Negative Negative  Negative     Other HR HPV Negative Positive  Negative       ASCVD Risk   The 10-year ASCVD risk score (Parris BHAGAT, et al., 2019) is: 4.9%    Values used to calculate the score:      Age: 58 years      Sex: Female      Is Non- : No      Diabetic: No      Tobacco smoker: No      Systolic Blood Pressure: 128 mmHg      Is BP treated: No      HDL Cholesterol: 36 mg/dL      Total Cholesterol: 311 mg/dL    Fracture Risk Assessment Tool  Link to Frax Calculator  Use the information below to complete the Frax calculator  : 1965  Sex: female  Weight (kg): 78.5 kg (actual weight)  Height (cm): 168.3 cm  Previous Fragility Fracture:  No  History of parent with fractured hip:  No  Current Smoking:  No  Patient has been on glucocorticoids for more than 3 months (5mg/day or more): No  Rheumatoid Arthritis on Problem List:  No  Secondary Osteoporosis on Problem List:  No  Consumes 3 or more units of alcohol per day: No  Femoral Neck BMD (g/cm2)     Reviewed and updated as needed this visit by Provider                    Past Medical History:   Diagnosis Date    Cellulitis and abscess of unspecified site -2005    Essential hypertension 3/30/2023    Hypoglycemia 2006    Problem list name updated by automated process. Provider to review    Lateral epicondylitis of right elbow 2021    Need for prophylactic hormone replacement therapy (postmenopausal)     birth control     Past Surgical History:   Procedure Laterality Date    BIOPSY BREAST Left     SURGICAL HISTORY OF -   10/16/1997    D&C     OB History    Para Term  AB Living   3 2 2 0 1 2   SAB IAB Ectopic Multiple Live  Births   1 0 0 0 0      # Outcome Date GA Lbr Moy/2nd Weight Sex Type Anes PTL Lv   3 Term            2 Term            1 SAB              Lab work is in process  Labs reviewed in EPIC  BP Readings from Last 3 Encounters:   05/10/24 128/72   04/06/23 138/82   03/30/23 138/88    Wt Readings from Last 3 Encounters:   05/10/24 78.5 kg (173 lb 1.6 oz)   03/30/23 76.6 kg (168 lb 14.4 oz)   02/21/23 (P) 79.4 kg (175 lb)                  Patient Active Problem List   Diagnosis    Dermatitis due to cosmetics    Mixed hyperlipidemia    Family history of polyps in the colon    Vitamin D deficiency    Pain in both knees, unspecified chronicity    Hypertriglyceridemia    Menopausal syndrome (hot flashes)    Vasomotor symptoms due to menopause    Right lateral epicondylitis    Dysfunction of both eustachian tubes    Irregular periods    Essential hypertension    Cervical high risk HPV (human papillomavirus) test positive     Past Surgical History:   Procedure Laterality Date    BIOPSY BREAST Left 2019    SURGICAL HISTORY OF -   10/16/1997    D&C       Social History     Tobacco Use    Smoking status: Never    Smokeless tobacco: Never    Tobacco comments:     none in home   Substance Use Topics    Alcohol use: Yes     Comment: 2 drinks a week     Family History   Problem Relation Age of Onset    Arthritis Mother     Cerebrovascular Disease Mother     Cancer Father         SKIN    Heart Disease Father         heart surgery - ?valve    Gastrointestinal Disease Father         colon polyps    Lipids Sister     Alcohol/Drug Brother     Allergies Sister     Genitourinary Problems Sister         KIDNEY STONES    Obesity Sister     Lung Cancer Sister 68        stage 4    Obesity Sister     Cancer - colorectal Maternal Grandmother     Depression Daughter     Anxiety Disorder Daughter     Depression Daughter     Breast Cancer No family hx of          Current Outpatient Medications   Medication Sig Dispense Refill    CALTRATE 600 + D  "600-200 MG-IU OR TABS 1 TABLET DAILY      cetirizine (ZYRTEC) 10 MG tablet Take 10 mg by mouth daily      Cholecalciferol (VITAMIN D3) 2000 UNITS TABS Take  by mouth. 1 tablet daily      estradiol (ESTRACE) 1 MG tablet Take 1 tablet (1 mg) by mouth daily 90 tablet 3    hydrocortisone (CORTAID) 1 % external cream Apply topically 2 times daily as needed 30 g 1    Multiple Vitamins-Minerals (MULTIVITAMIN OR) Take  by mouth. 1 tablet daily      OMEGA-3 CAPS   OR 2 tablets daily  0    polyethylene glycol (MIRALAX) 17 GM/SCOOP powder Take 17 g (1 capful) by mouth daily 578 g 9    progesterone (PROMETRIUM) 100 MG capsule Take 1 capsule (100 mg) by mouth daily 90 capsule 3    Psyllium (METAMUCIL PO)       TURMERIC-GINGER PO       VITAMIN C 500 MG OR TABS 1 TABLET TWICE DAILY       Allergies   Allergen Reactions    Amoxicillin-Pot Clavulanate Rash    Cephalosporins Hives    Pcn [Penicillins] Hives     systemic    Sulfa Antibiotics Rash     Recent Labs   Lab Test 05/10/24  1023 03/20/23  0828 03/05/20  1019 09/20/17  0814   LDL  --  107* 196* 152*   HDL  --  36* 62 67   TRIG  --  873* 269* 226*   CR 0.76  --   --   --    GFRESTIMATED >90  --   --   --    POTASSIUM 3.9  --   --   --           Review of Systems  Constitutional, HEENT, cardiovascular, pulmonary, GI, , musculoskeletal, neuro, skin, endocrine and psych systems are negative, except as otherwise noted.  POS for brain fog, fatigue - during the day - reports getting good sleep at night. Periods are occurring every month - but did miss last month and slightly lighter recently.  On HRT as she was getting vasomotor symptoms and was on oral contraceptive pills and thought was in perimenopause.     Objective    Exam  /72 (BP Location: Right arm, Patient Position: Sitting, Cuff Size: Adult Large)   Pulse 73   Temp 98  F (36.7  C) (Tympanic)   Resp 16   Ht 1.683 m (5' 6.25\")   Wt 78.5 kg (173 lb 1.6 oz)   SpO2 99%   BMI 27.73 kg/m     Estimated body mass index " "is 27.73 kg/m  as calculated from the following:    Height as of this encounter: 1.683 m (5' 6.25\").    Weight as of this encounter: 78.5 kg (173 lb 1.6 oz).    Physical Exam  GENERAL: alert and no distress  EYES: Eyes grossly normal to inspection, PERRL and conjunctivae and sclerae normal  HENT: ear canals and TM's normal, nose and mouth without ulcers or lesions  NECK: no adenopathy, no asymmetry, masses, or scars  RESP: lungs clear to auscultation - no rales, rhonchi or wheezes  CV: regular rate and rhythm, normal S1 S2, no S3 or S4, no murmur, click or rub, no peripheral edema  ABDOMEN: soft, nontender, no hepatosplenomegaly, no masses and bowel sounds normal   (female): normal female external genitalia, normal urethral meatus, normal vaginal mucosa, scant bloody discharge, pap obtained.  MS: no gross musculoskeletal defects noted, no edema  SKIN: no suspicious lesions or rashes  NEURO: Normal strength and tone, mentation intact and speech normal  PSYCH: mentation appears normal, affect normal/bright        Signed Electronically by: Leola Reyna DNP    "

## 2024-05-10 NOTE — PATIENT INSTRUCTIONS
"Preventive Care Advice   This is general advice we often give to help people stay healthy. Your care team may have specific advice just for you. Please talk to your care team about your own preventive care needs.  Lifestyle  Exercise at least 150 minutes each week (30 minutes a day, 5 days a week).  Do muscle strengthening activities 2 days a week. These help control your weight and prevent disease.  No smoking.  Wear sunscreen to prevent skin cancer.  Have your home tested for radon every 2 to 5 years. Radon is a colorless, odorless gas that can harm your lungs. To learn more, go to www.health.[facility].mn. and search for \"Radon in Homes.\"  Keep guns unloaded and locked up in a safe place like a safe or gun vault, or, use a gun lock and hide the keys. Always lock away bullets separately. To learn more, visit [employer].mn.gov and search for \"safe gun storage.\"  Nutrition  Eat 5 or more servings of fruits and vegetables each day.  Try wheat bread, brown rice and whole grain pasta (instead of white bread, rice, and pasta).  Get enough calcium and vitamin D. Check the label on foods and aim for 100% of the RDA (recommended daily allowance).  Regular exams  Have a dental exam and cleaning every 6 months.  See your health care team every year to talk about:  Any changes in your health.  Any medicines your care team has prescribed.  Preventive care, family planning, and ways to prevent chronic diseases.  Shots (vaccines)   HPV shots (up to age 26), if you've never had them before.  Hepatitis B shots (up to age 59), if you've never had them before.  COVID-19 shot: Get this shot when it's due.  Flu shot: Get a flu shot every year.  Tetanus shot: Get a tetanus shot every 10 years.  Pneumococcal, hepatitis A, and RSV shots: Ask your care team if you need these based on your risk.  Shingles shot (for age 50 and up).  General health tests  Diabetes screening:  Starting at age 35, Get screened for diabetes at least every 3 years.  If " you are younger than age 35, ask your care team if you should be screened for diabetes.  Cholesterol test: At age 39, start having a cholesterol test every 5 years, or more often if advised.  Bone density scan (DEXA): At age 50, ask your care team if you should have this scan for osteoporosis (brittle bones).  Hepatitis C: Get tested at least once in your life.  Abdominal aortic aneurysm screening: Talk to your doctor about having this screening if you:  Have ever smoked; and  Are biologically male; and  Are between the ages of 65 and 75.  STIs (sexually transmitted infections)  Before age 24: Ask your care team if you should be screened for STIs.  After age 24: Get screened for STIs if you're at risk. You are at risk for STIs (including HIV) if:  You are sexually active with more than one person.  You don't use condoms every time.  You or a partner was diagnosed with a sexually transmitted infection.  If you are at risk for HIV, ask about PrEP medicine to prevent HIV.  Get tested for HIV at least once in your life, whether you are at risk for HIV or not.  Cancer screening tests  Cervical cancer screening: If you have a cervix, begin getting regular cervical cancer screening tests at age 21. Most people who have regular screenings with normal results can stop after age 65. Talk about this with your provider.  Breast cancer scan (mammogram): If you've ever had breasts, begin having regular mammograms starting at age 40. This is a scan to check for breast cancer.  Colon cancer screening: It is important to start screening for colon cancer at age 45.  Have a colonoscopy test every 10 years (or more often if you're at risk) Or, ask your provider about stool tests like a FIT test every year or Cologuard test every 3 years.  To learn more about your testing options, visit: www.[employer]/618262.pdf.  For help making a decision, visit: mabel/uf25908.  Prostate cancer screening test: If you have a prostate and are age 55  to 69, ask your provider if you would benefit from a yearly prostate cancer screening test.  Lung cancer screening: If you are a current or former smoker age 50 to 80, ask your care team if ongoing lung cancer screenings are right for you.  For informational purposes only. Not to replace the advice of your health care provider. Copyright   2023 [employer]. All rights reserved. Clinically reviewed by the [facility]. [employer] [id number] - REV 04/24.    Learning About Stress  What is stress?     Stress is your body's response to a hard situation. Your body can have a physical, emotional, or mental response. Stress is a fact of life for most people, and it affects everyone differently. What causes stress for you may not be stressful for someone else.  A lot of things can cause stress. You may feel stress when you go on a job interview, take a test, or run a race. This kind of short-term stress is normal and even useful. It can help you if you need to work hard or react quickly. For example, stress can help you finish an important job on time.  Long-term stress is caused by ongoing stressful situations or events. Examples of long-term stress include long-term health problems, ongoing problems at work, or conflicts in your family. Long-term stress can harm your health.  How does stress affect your health?  When you are stressed, your body responds as though you are in danger. It makes hormones that speed up your heart, make you breathe faster, and give you a burst of energy. This is called the fight-or-flight stress response. If the stress is over quickly, your body goes back to normal and no harm is done.  But if stress happens too often or lasts too long, it can have bad effects. Long-term stress can make you more likely to get sick, and it can make symptoms of some diseases worse. If you tense up when you are stressed, you may develop neck, shoulder, or low back pain. Stress is  linked to high blood pressure and heart disease.  Stress also harms your emotional health. It can make you crawford, tense, or depressed. Your relationships may suffer, and you may not do well at work or school.  What can you do to manage stress?  You can try these things to help manage stress:   Do something active. Exercise or activity can help reduce stress. Walking is a great way to get started. Even everyday activities such as housecleaning or yard work can help.  Try yoga or matt chi. These techniques combine exercise and meditation. You may need some training at first to learn them.  Do something you enjoy. For example, listen to music or go to a movie. Practice your hobby or do volunteer work.  Meditate. This can help you relax, because you are not worrying about what happened before or what may happen in the future.  Do guided imagery. Imagine yourself in any setting that helps you feel calm. You can use online videos, books, or a teacher to guide you.  Do breathing exercises. For example:  From a standing position, bend forward from the waist with your knees slightly bent. Let your arms dangle close to the floor.  Breathe in slowly and deeply as you return to a standing position. Roll up slowly and lift your head last.  Hold your breath for just a few seconds in the standing position.  Breathe out slowly and bend forward from the waist.  Let your feelings out. Talk, laugh, cry, and express anger when you need to. Talking with supportive friends or family, a counselor, or a nahomi leader about your feelings is a healthy way to relieve stress. Avoid discussing your feelings with people who make you feel worse.  Write. It may help to write about things that are bothering you. This helps you find out how much stress you feel and what is causing it. When you know this, you can find better ways to cope.  What can you do to prevent stress?  You might try some of these things to help prevent stress:  Manage your time.  "This helps you find time to do the things you want and need to do.  Get enough sleep. Your body recovers from the stresses of the day while you are sleeping.  Get support. Your family, friends, and community can make a difference in how you experience stress.  Limit your news feed. Avoid or limit time on social media or news that may make you feel stressed.  Do something active. Exercise or activity can help reduce stress. Walking is a great way to get started.  Where can you learn more?  Go to https://www.[employer].net/patiented  Enter N032 in the search box to learn more about \"Learning About Stress.\"  Current as of: October 24, 2023               Content Version: 14.0    4773-6450 [employer].   Care instructions adapted under license by your healthcare professional. If you have questions about a medical condition or this instruction, always ask your healthcare professional. [employer] disclaims any warranty or liability for your use of this information.      "

## 2024-05-21 ENCOUNTER — PATIENT OUTREACH (OUTPATIENT)
Dept: FAMILY MEDICINE | Facility: CLINIC | Age: 59
End: 2024-05-21
Payer: COMMERCIAL

## 2024-05-21 LAB
HPV HR 12 DNA CVX QL NAA+PROBE: NEGATIVE
HPV16 DNA CVX QL NAA+PROBE: NEGATIVE
HPV18 DNA CVX QL NAA+PROBE: NEGATIVE
HUMAN PAPILLOMA VIRUS FINAL DIAGNOSIS: NORMAL

## 2024-08-16 ENCOUNTER — VIRTUAL VISIT (OUTPATIENT)
Dept: FAMILY MEDICINE | Facility: CLINIC | Age: 59
End: 2024-08-16
Payer: COMMERCIAL

## 2024-08-16 DIAGNOSIS — M77.12 LATERAL EPICONDYLITIS OF LEFT ELBOW: Primary | ICD-10-CM

## 2024-08-16 DIAGNOSIS — N93.9 ABNORMAL UTERINE BLEEDING (AUB): ICD-10-CM

## 2024-08-16 DIAGNOSIS — Z87.39 HISTORY OF RIGHT TENNIS ELBOW: ICD-10-CM

## 2024-08-16 PROCEDURE — 99213 OFFICE O/P EST LOW 20 MIN: CPT | Mod: 95 | Performed by: NURSE PRACTITIONER

## 2024-08-16 PROCEDURE — G2211 COMPLEX E/M VISIT ADD ON: HCPCS | Mod: 95 | Performed by: NURSE PRACTITIONER

## 2024-08-16 RX ORDER — METHYLPREDNISOLONE 4 MG
TABLET, DOSE PACK ORAL
Qty: 21 TABLET | Refills: 0 | Status: SHIPPED | OUTPATIENT
Start: 2024-08-16

## 2024-08-16 NOTE — PATIENT INSTRUCTIONS
Discussed treatments and options for tendonitis.    Medrol dose - steroid taper - to help for pain and inflammation.    Referral Ortho - [facility] to give recommendations.    Recommend ongoing home therapy and treatments.  Ice 20 minutes to area 2-3 x daily.    Ortho  Representative at: [phone]   OB/GYN scheduling please call [phone]     Leola Reyna DNP

## 2024-08-16 NOTE — PROGRESS NOTES
Pearl is a 58 year old who is being evaluated via a billable video visit.    How would you like to obtain your AVS? MyChart  If the video visit is dropped, the invitation should be resent by: Text to cell phone: [phone]  Will anyone else be joining your video visit? No      Assessment & Plan     Lateral epicondylitis of left elbow  Patient has reportedly tried and failed conservative management - PT, home NSAIDs topical and po, stretches, chiropractic/acupuncture.  Referral Ortho   Medrol dose pack    - methylPREDNISolone (MEDROL DOSEPAK) 4 MG tablet therapy pack  Dispense: 21 tablet; Refill: 0  - Orthopedic  Referral    History of right tennis elbow     - methylPREDNISolone (MEDROL DOSEPAK) 4 MG tablet therapy pack  Dispense: 21 tablet; Refill: 0  - Orthopedic  Referral    Abnormal uterine bleeding (AUB)  On HRT, having irregular long cycles with bleeding.    US - then follow up with OB/GYN.    - US Pelvic Complete with Transvaginal  - Ob/Gyn  Referral              See Patient Instructions    The longitudinal plan of care for the diagnosis(es)/condition(s) as documented were addressed during this visit. Due to the added complexity in care, I will continue to support Pearl in the subsequent management and with ongoing continuity of care.      Celia Kang is a 58 year old, presenting for the following health issues:  No chief complaint on file.        8/16/2024     8:51 AM   Additional Questions   Roomed by Kindra HYDE LPN   Accompanied by self     History of Present Illness       Reason for visit:  Chronic tendinitis both arms    She eats 2-3 servings of fruits and vegetables daily.She consumes 0 sweetened beverage(s) daily.She exercises with enough effort to increase her heart rate 60 or more minutes per day.  She exercises with enough effort to increase her heart rate 3 or less days per week.   She is taking medications regularly.     Reports has had tendonitis for years -  first started in her right arm - this has improved.  Now noticing it more in her left arm for the past few months.  Started in the winter.    Denies any new trauma or injury.    Pain described as both achy and sharp pain - radiating down to hand at times.  Worse with gripping or fine motor - picking up cup, washing hair.    Unable to do normal activities - likes to golf.    Was seeing PT and reports did not help with pain or symptoms.    Reports having bleeding and cycles - occasionally missing a period.  Lasting 2 weeks.  No abdominal cramping.  Unsure of when mother went through menopause.    On HRT therapy with estrogen and progesterone.    Review of Systems  Constitutional, neuro, ENT, endocrine, pulmonary, cardiac, gastrointestinal, genitourinary, musculoskeletal, integument and psychiatric systems are negative, except as otherwise noted.      Objective           Vitals:  No vitals were obtained today due to virtual visit.    Physical Exam   GENERAL: alert and no distress  EYES: Eyes grossly normal to inspection.  No discharge or erythema, or obvious scleral/conjunctival abnormalities.  RESP: No audible wheeze, cough, or visible cyanosis.    SKIN: Visible skin clear. No significant rash, abnormal pigmentation or lesions.  NEURO: Cranial nerves grossly intact.  Mentation and speech appropriate for age.  PSYCH: Appropriate affect, tone, and pace of words           Video-Visit Details    Type of service:  Video Visit   Originating Location (pt. Location): Home    Distant Location (provider location):  On-site  Platform used for Video Visit: Karthik  Signed Electronically by: Leola Reyna DNP

## 2024-09-30 ENCOUNTER — HOSPITAL ENCOUNTER (OUTPATIENT)
Dept: MAMMOGRAPHY | Facility: CLINIC | Age: 59
Discharge: HOME | End: 2024-09-30
Attending: NURSE PRACTITIONER | Admitting: NURSE PRACTITIONER
Payer: COMMERCIAL

## 2024-09-30 DIAGNOSIS — Z12.31 VISIT FOR SCREENING MAMMOGRAM: ICD-10-CM

## 2024-09-30 PROCEDURE — 77063 BREAST TOMOSYNTHESIS BI: CPT

## 2024-11-05 DIAGNOSIS — E78.2 MIXED HYPERLIPIDEMIA: ICD-10-CM

## 2024-11-05 DIAGNOSIS — E78.1 HYPERTRIGLYCERIDEMIA: ICD-10-CM

## 2024-11-05 RX ORDER — FENOFIBRATE 54 MG/1
TABLET ORAL
Qty: 90 TABLET | Refills: 2 | Status: SHIPPED | OUTPATIENT
Start: 2024-11-05

## 2024-11-25 ENCOUNTER — MYC MEDICAL ADVICE (OUTPATIENT)
Dept: FAMILY MEDICINE | Facility: CLINIC | Age: 59
End: 2024-11-25
Payer: COMMERCIAL

## 2024-12-17 ENCOUNTER — VIRTUAL VISIT (OUTPATIENT)
Dept: FAMILY MEDICINE | Facility: CLINIC | Age: 59
End: 2024-12-17
Payer: COMMERCIAL

## 2024-12-17 DIAGNOSIS — G89.29 CHRONIC UPPER BACK PAIN: ICD-10-CM

## 2024-12-17 DIAGNOSIS — N64.59 ABNORMAL BREAST TISSUE: ICD-10-CM

## 2024-12-17 DIAGNOSIS — M54.9 CHRONIC UPPER BACK PAIN: ICD-10-CM

## 2024-12-17 DIAGNOSIS — N95.1 MENOPAUSAL SYNDROME (HOT FLASHES): ICD-10-CM

## 2024-12-17 DIAGNOSIS — N95.1 VASOMOTOR SYMPTOMS DUE TO MENOPAUSE: Primary | ICD-10-CM

## 2024-12-17 PROCEDURE — 99213 OFFICE O/P EST LOW 20 MIN: CPT | Mod: 95 | Performed by: NURSE PRACTITIONER

## 2024-12-17 PROCEDURE — G2211 COMPLEX E/M VISIT ADD ON: HCPCS | Mod: 95 | Performed by: NURSE PRACTITIONER

## 2024-12-17 RX ORDER — ESTRADIOL 1 MG/1
1.5 TABLET ORAL DAILY
Qty: 135 TABLET | Refills: 3 | Status: SHIPPED | OUTPATIENT
Start: 2024-12-17

## 2024-12-17 RX ORDER — PROGESTERONE 100 MG/1
100 CAPSULE ORAL DAILY
Qty: 90 CAPSULE | Refills: 3 | Status: SHIPPED | OUTPATIENT
Start: 2024-12-17

## 2024-12-17 NOTE — PROGRESS NOTES
"Pearl is a 59 year old who is being evaluated via a billable video visit.    How would you like to obtain your AVS? MyChart  If the video visit is dropped, the invitation should be resent by: Text to cell phone: [phone]  Will anyone else be joining your video visit? No      Assessment & Plan     Vasomotor symptoms due to menopause  Worsening - having more irregular periods - LMP 8/2024.  Increase Estradiol slightly to help with symptoms.  No FH or PH of smoking or clotting disorders.  Follow up in 1-2 months for recheck    - estradiol (ESTRACE) 1 MG tablet  Dispense: 135 tablet; Refill: 3  - progesterone (PROMETRIUM) 100 MG capsule  Dispense: 90 capsule; Refill: 3    Menopausal syndrome (hot flashes)       Abnormal breast tissue     - Adult Plastic Surgery  Referral    Chronic upper back pain  Likely due to above - has been chronic.    - Adult Plastic Surgery  Referral    The risks, benefits and treatment options of prescribed medications or other treatments have been discussed with the patient. The patient verbalized their understanding and should call or follow up if no improvement or if they develop further problems.                  BMI  Estimated body mass index is 27.73 kg/m  as calculated from the following:    Height as of 5/10/24: 1.683 m (5' 6.25\").    Weight as of 5/10/24: 78.5 kg (173 lb 1.6 oz).         See Patient Instructions    The longitudinal plan of care for the diagnosis(es)/condition(s) as documented were addressed during this visit. Due to the added complexity in care, I will continue to support Pearl in the subsequent management and with ongoing continuity of care.      Subjective   Pearl is a 59 year old, presenting for the following health issues:  Menopausal Sx (Hot Flashes ) and Back Pain        12/17/2024     9:40 AM   Additional Questions   Roomed by Alfredo POOL CMA   Accompanied by self     History of Present Illness       Back Pain:  She presents for follow up of " back pain. Patient's back pain is a chronic problem.  Location of back pain:  Right lower back, left lower back, right middle of back, left middle of back, right side of neck and right shoulder  Description of back pain: burning, dull ache and other  Back pain spreads: right buttocks and right side of neck    Since patient first noticed back pain, pain is: always present, but gets better and worse  Does back pain interfere with her job:  Not applicable       Reason for visit:  Hot flashes, back pain    She eats 2-3 servings of fruits and vegetables daily.She consumes 0 sweetened beverage(s) daily.She exercises with enough effort to increase her heart rate 60 or more minutes per day.  She exercises with enough effort to increase her heart rate 4 days per week.   She is taking medications regularly.    Reports burning in mid to lower back. Reports some numbness into upper back and shoulders.  Pain in shoulders bilateral - constant, dull ache.  Reports has large breast tissue.      Reporting more hot flashes evening and nighttime   Reports ongoing fatigue/symptoms - this has not changed.  Reports some mild cramping pelvic - LMP August (spotting), July period.    Taking HRT started 1 year ago - Progesterone 100 mg once daily.  Taking Estradiol 1 mg once daily.    UTD on Mammogram Sept 2024 - negative.    Review of Systems  Constitutional, HEENT, cardiovascular, pulmonary, GI, , musculoskeletal, neuro, skin, endocrine and psych systems are negative, except as otherwise noted.      Objective    Vitals - Patient Reported  Pain Score: No Pain (1)        Physical Exam   GENERAL: alert and no distress  EYES: Eyes grossly normal to inspection.  No discharge or erythema, or obvious scleral/conjunctival abnormalities.  RESP: No audible wheeze, cough, or visible cyanosis.    SKIN: Visible skin clear. No significant rash, abnormal pigmentation or lesions.  NEURO: Cranial nerves grossly intact.  Mentation and speech appropriate  for age.  PSYCH: Appropriate affect, tone, and pace of words          Video-Visit Details    Type of service:  Video Visit   Originating Location (pt. Location): Home    Distant Location (provider location):  On-site  Platform used for Video Visit: Karthik  Signed Electronically by: Leola Reyna DNP

## 2024-12-17 NOTE — PATIENT INSTRUCTIONS
Increase Estradiol to 1.5 mg (1 1/2 tablets) once daily - Abelt me in 3-4 weeks with update on symptoms. If hot flashes are not significantly improved then would further increase the dose.    Continue Progesterone at 100 mg once daily.    Continue yearly mammograms.    Leola Reyna DNP      Adult Plastic Surgery - please call [phone] to schedule.

## 2025-04-10 ENCOUNTER — PATIENT OUTREACH (OUTPATIENT)
Dept: CARE COORDINATION | Facility: CLINIC | Age: 60
End: 2025-04-10
Payer: COMMERCIAL

## 2025-04-23 ENCOUNTER — PATIENT OUTREACH (OUTPATIENT)
Dept: FAMILY MEDICINE | Facility: CLINIC | Age: 60
End: 2025-04-23
Payer: COMMERCIAL

## 2025-04-24 ENCOUNTER — PATIENT OUTREACH (OUTPATIENT)
Dept: CARE COORDINATION | Facility: CLINIC | Age: 60
End: 2025-04-24
Payer: COMMERCIAL

## 2025-05-15 DIAGNOSIS — E78.1 HYPERTRIGLYCERIDEMIA: ICD-10-CM

## 2025-05-15 DIAGNOSIS — E78.2 MIXED HYPERLIPIDEMIA: ICD-10-CM

## 2025-05-15 RX ORDER — FENOFIBRATE 54 MG/1
54 TABLET ORAL DAILY
Qty: 90 TABLET | Refills: 2 | OUTPATIENT
Start: 2025-05-15

## 2025-07-07 ENCOUNTER — TELEPHONE (OUTPATIENT)
Dept: FAMILY MEDICINE | Facility: CLINIC | Age: 60
End: 2025-07-07
Payer: COMMERCIAL

## 2025-07-07 NOTE — TELEPHONE ENCOUNTER
Patient Quality Outreach    Patient is due for the following:   Cervical Cancer Screening - PAP Needed  Physical Preventive Adult Physical    Action(s) Taken:   Patient has upcoming appointment, these items will be addressed at that time.    Type of outreach:    Chart review performed, no outreach needed.    Questions for provider review:    None         Alfredo Ely, RACHAEL  Chart routed to None.

## 2025-07-14 SDOH — HEALTH STABILITY: PHYSICAL HEALTH: ON AVERAGE, HOW MANY MINUTES DO YOU ENGAGE IN EXERCISE AT THIS LEVEL?: 60 MIN

## 2025-07-14 SDOH — HEALTH STABILITY: PHYSICAL HEALTH: ON AVERAGE, HOW MANY DAYS PER WEEK DO YOU ENGAGE IN MODERATE TO STRENUOUS EXERCISE (LIKE A BRISK WALK)?: 3 DAYS

## 2025-07-14 ASSESSMENT — SOCIAL DETERMINANTS OF HEALTH (SDOH): HOW OFTEN DO YOU GET TOGETHER WITH FRIENDS OR RELATIVES?: TWICE A WEEK

## 2025-07-16 ENCOUNTER — OFFICE VISIT (OUTPATIENT)
Dept: FAMILY MEDICINE | Facility: CLINIC | Age: 60
End: 2025-07-16
Payer: COMMERCIAL

## 2025-07-16 VITALS
WEIGHT: 165.8 LBS | BODY MASS INDEX: 26.65 KG/M2 | HEIGHT: 66 IN | SYSTOLIC BLOOD PRESSURE: 138 MMHG | TEMPERATURE: 97.2 F | DIASTOLIC BLOOD PRESSURE: 86 MMHG | OXYGEN SATURATION: 99 % | HEART RATE: 69 BPM

## 2025-07-16 DIAGNOSIS — Z12.31 ENCOUNTER FOR SCREENING MAMMOGRAM FOR BREAST CANCER: ICD-10-CM

## 2025-07-16 DIAGNOSIS — N92.6 IRREGULAR PERIODS: ICD-10-CM

## 2025-07-16 DIAGNOSIS — D36.7 DERMOID CYST OF HEAD: ICD-10-CM

## 2025-07-16 DIAGNOSIS — Z12.4 CERVICAL CANCER SCREENING: ICD-10-CM

## 2025-07-16 DIAGNOSIS — N95.1 VASOMOTOR SYMPTOMS DUE TO MENOPAUSE: ICD-10-CM

## 2025-07-16 DIAGNOSIS — N64.59 ABNORMAL BREAST TISSUE: ICD-10-CM

## 2025-07-16 DIAGNOSIS — E78.2 MIXED HYPERLIPIDEMIA: ICD-10-CM

## 2025-07-16 DIAGNOSIS — Z00.00 ROUTINE GENERAL MEDICAL EXAMINATION AT A HEALTH CARE FACILITY: Primary | ICD-10-CM

## 2025-07-16 DIAGNOSIS — E78.1 HYPERTRIGLYCERIDEMIA: ICD-10-CM

## 2025-07-16 DIAGNOSIS — I10 ESSENTIAL HYPERTENSION: ICD-10-CM

## 2025-07-16 DIAGNOSIS — Z13.6 SCREENING FOR CARDIOVASCULAR CONDITION: ICD-10-CM

## 2025-07-16 LAB
ANION GAP SERPL CALCULATED.3IONS-SCNC: 10 MMOL/L (ref 7–15)
BUN SERPL-MCNC: 14 MG/DL (ref 6–20)
CALCIUM SERPL-MCNC: 9.3 MG/DL (ref 8.8–10.4)
CHLORIDE SERPL-SCNC: 106 MMOL/L (ref 98–107)
CHOLEST SERPL-MCNC: 259 MG/DL
CREAT SERPL-MCNC: 0.85 MG/DL (ref 0.51–0.95)
EGFRCR SERPLBLD CKD-EPI 2021: 79 ML/MIN/1.73M2
ERYTHROCYTE [DISTWIDTH] IN BLOOD BY AUTOMATED COUNT: 12.6 % (ref 10–15)
FASTING STATUS PATIENT QL REPORTED: YES
FASTING STATUS PATIENT QL REPORTED: YES
FERRITIN SERPL-MCNC: 260 NG/ML (ref 11–328)
GLUCOSE SERPL-MCNC: 90 MG/DL (ref 70–99)
HCO3 SERPL-SCNC: 25 MMOL/L (ref 22–29)
HCT VFR BLD AUTO: 37.5 % (ref 35–47)
HDLC SERPL-MCNC: 57 MG/DL
HGB BLD-MCNC: 12.7 G/DL (ref 11.7–15.7)
HPV HR 12 DNA CVX QL NAA+PROBE: NEGATIVE
HPV16 DNA CVX QL NAA+PROBE: NEGATIVE
HPV18 DNA CVX QL NAA+PROBE: NEGATIVE
HUMAN PAPILLOMA VIRUS FINAL DIAGNOSIS: NORMAL
LDLC SERPL CALC-MCNC: 146 MG/DL
MCH RBC QN AUTO: 30.9 PG (ref 26.5–33)
MCHC RBC AUTO-ENTMCNC: 33.9 G/DL (ref 31.5–36.5)
MCV RBC AUTO: 91 FL (ref 78–100)
NONHDLC SERPL-MCNC: 202 MG/DL
PLATELET # BLD AUTO: 277 10E3/UL (ref 150–450)
POTASSIUM SERPL-SCNC: 3.9 MMOL/L (ref 3.4–5.3)
RBC # BLD AUTO: 4.11 10E6/UL (ref 3.8–5.2)
SODIUM SERPL-SCNC: 141 MMOL/L (ref 135–145)
TRIGL SERPL-MCNC: 281 MG/DL
WBC # BLD AUTO: 5.1 10E3/UL (ref 4–11)

## 2025-07-16 PROCEDURE — 36415 COLL VENOUS BLD VENIPUNCTURE: CPT | Performed by: NURSE PRACTITIONER

## 2025-07-16 PROCEDURE — 85027 COMPLETE CBC AUTOMATED: CPT | Performed by: NURSE PRACTITIONER

## 2025-07-16 PROCEDURE — 3075F SYST BP GE 130 - 139MM HG: CPT | Performed by: NURSE PRACTITIONER

## 2025-07-16 PROCEDURE — 90471 IMMUNIZATION ADMIN: CPT | Performed by: NURSE PRACTITIONER

## 2025-07-16 PROCEDURE — 80061 LIPID PANEL: CPT | Performed by: NURSE PRACTITIONER

## 2025-07-16 PROCEDURE — 3079F DIAST BP 80-89 MM HG: CPT | Performed by: NURSE PRACTITIONER

## 2025-07-16 PROCEDURE — 87624 HPV HI-RISK TYP POOLED RSLT: CPT | Performed by: NURSE PRACTITIONER

## 2025-07-16 PROCEDURE — 1126F AMNT PAIN NOTED NONE PRSNT: CPT | Performed by: NURSE PRACTITIONER

## 2025-07-16 PROCEDURE — 99459 PELVIC EXAMINATION: CPT | Performed by: NURSE PRACTITIONER

## 2025-07-16 PROCEDURE — 80048 BASIC METABOLIC PNL TOTAL CA: CPT | Performed by: NURSE PRACTITIONER

## 2025-07-16 PROCEDURE — 90750 HZV VACC RECOMBINANT IM: CPT | Performed by: NURSE PRACTITIONER

## 2025-07-16 PROCEDURE — 99396 PREV VISIT EST AGE 40-64: CPT | Mod: 25 | Performed by: NURSE PRACTITIONER

## 2025-07-16 PROCEDURE — 82728 ASSAY OF FERRITIN: CPT | Performed by: NURSE PRACTITIONER

## 2025-07-16 PROCEDURE — 99213 OFFICE O/P EST LOW 20 MIN: CPT | Mod: 25 | Performed by: NURSE PRACTITIONER

## 2025-07-16 RX ORDER — ESTRADIOL 1 MG/1
1.5 TABLET ORAL DAILY
Qty: 135 TABLET | Refills: 3 | Status: SHIPPED | OUTPATIENT
Start: 2025-07-16

## 2025-07-16 RX ORDER — FENOFIBRATE 54 MG/1
54 TABLET ORAL DAILY
Qty: 90 TABLET | Refills: 3 | Status: SHIPPED | OUTPATIENT
Start: 2025-07-16

## 2025-07-16 RX ORDER — PROGESTERONE 100 MG/1
100 CAPSULE ORAL DAILY
Qty: 90 CAPSULE | Refills: 3 | Status: SHIPPED | OUTPATIENT
Start: 2025-07-16

## 2025-07-16 ASSESSMENT — PAIN SCALES - GENERAL: PAINLEVEL_OUTOF10: NO PAIN (0)

## 2025-07-16 NOTE — PATIENT INSTRUCTIONS
Patient Education     Monitor blood pressure at home goal < 140/90 - if consistently elevated then Mychart me and we will start medication for hypertension.    Mychart me if lump on forehead gets larger, red or painful.    Leola Reyna DNP        Preventive Care Advice   This is general advice given by our system to help you stay healthy. However, your care team may have specific advice just for you. Please talk to your care team about your preventive care needs.  Nutrition  Eat 5 or more servings of fruits and vegetables each day.  Try wheat bread, brown rice and whole grain pasta (instead of white bread, rice, and pasta).  Get enough calcium and vitamin D. Check the label on foods and aim for 100% of the RDA (recommended daily allowance).  Lifestyle  Exercise at least 150 minutes each week  (30 minutes a day, 5 days a week).  Do muscle strengthening activities 2 days a week. These help control your weight and prevent disease.  No smoking.  Wear sunscreen to prevent skin cancer.  Have a dental exam and cleaning every 6 months.  Yearly exams  See your health care team every year to talk about:  Any changes in your health.  Any medicines your care team has prescribed.  Preventive care, family planning, and ways to prevent chronic diseases.  Shots (vaccines)   HPV shots (up to age 26), if you've never had them before.  Hepatitis B shots (up to age 59), if you've never had them before.  COVID-19 shot: Get this shot when it's due.  Flu shot: Get a flu shot every year.  Tetanus shot: Get a tetanus shot every 10 years.  Pneumococcal, hepatitis A, and RSV shots: Ask your care team if you need these based on your risk.  Shingles shot (for age 50 and up)  General health tests  Diabetes screening:  Starting at age 35, Get screened for diabetes at least every 3 years.  If you are younger than age 35, ask your care team if you should be screened for diabetes.  Cholesterol test: At age 39, start having a cholesterol test  every 5 years, or more often if advised.  Bone density scan (DEXA): At age 50, ask your care team if you should have this scan for osteoporosis (brittle bones).  Hepatitis C: Get tested at least once in your life.  STIs (sexually transmitted infections)  Before age 24: Ask your care team if you should be screened for STIs.  After age 24: Get screened for STIs if you're at risk. You are at risk for STIs (including HIV) if:  You are sexually active with more than one person.  You don't use condoms every time.  You or a partner was diagnosed with a sexually transmitted infection.  If you are at risk for HIV, ask about PrEP medicine to prevent HIV.  Get tested for HIV at least once in your life, whether you are at risk for HIV or not.  Cancer screening tests  Cervical cancer screening: If you have a cervix, begin getting regular cervical cancer screening tests starting at age 21.  Breast cancer scan (mammogram): If you've ever had breasts, begin having regular mammograms starting at age 40. This is a scan to check for breast cancer.  Colon cancer screening: It is important to start screening for colon cancer at age 45.  Have a colonoscopy test every 10 years (or more often if you're at risk) Or, ask your provider about stool tests like a FIT test every year or Cologuard test every 3 years.  To learn more about your testing options, visit:   .  For help making a decision, visit:   https://bit.ly/zr17794.  Prostate cancer screening test: If you have a prostate, ask your care team if a prostate cancer screening test (PSA) at age 55 is right for you.  Lung cancer screening: If you are a current or former smoker ages 50 to 80, ask your care team if ongoing lung cancer screenings are right for you.  For informational purposes only. Not to replace the advice of your health care provider. Copyright   2023 [employer]. All rights reserved. Clinically reviewed by the [facility] Transitions Program. [employer]  880701 - REV 01/24.

## 2025-07-16 NOTE — PROGRESS NOTES
Preventive Care Visit  [facility]  Leola Reyna, HOWARD, Family Medicine  Jul 16, 2025      Assessment & Plan     Routine general medical examination at a health care facility       Essential hypertension  Initially elevated in clinic - monitor outpatient.  See AVS.    - BASIC METABOLIC PANEL  - OFFICE/OUTPT VISIT,EST,LEVL III    Screening for cardiovascular condition     - OFFICE/OUTPT VISIT,EST,LEVL III    Hypertriglyceridemia     - Lipid panel reflex to direct LDL Non-fasting  - fenofibrate (LOFIBRA) 54 MG tablet  Dispense: 90 tablet; Refill: 3  - OFFICE/OUTPT VISIT,EST,LEVL III    Cervical cancer screening     - HPV and Gynecologic Cytology Panel - Recommended Age 30 - 65 Years  - OFFICE/OUTPT VISIT,EST,LEVL III    Vasomotor symptoms due to menopause  Controlled with use of HRT.    - estradiol (ESTRACE) 1 MG tablet  Dispense: 135 tablet; Refill: 3  - progesterone (PROMETRIUM) 100 MG capsule  Dispense: 90 capsule; Refill: 3  - OFFICE/OUTPT VISIT,EST,LEVL III    Mixed hyperlipidemia     - fenofibrate (LOFIBRA) 54 MG tablet  Dispense: 90 tablet; Refill: 3  - OFFICE/OUTPT VISIT,EST,LEVL III    Abnormal breast tissue  Requesting referral for breast reduction.    - Adult Plastic Surgery  Referral  - OFFICE/OUTPT VISIT,EST,LEVL III    Irregular periods  Likely menopausal - given heavy irregular periods follow up with Pelvic US to rule out other causes.  Repeat Hgb today.    - US Pelvic Complete with Transvaginal  - CBC with platelets  - Ferritin  - OFFICE/OUTPT VISIT,EST,LEVL III    Encounter for screening mammogram for breast cancer     - MA Screen Bilateral w/Jorge Alberto  - OFFICE/OUTPT VISIT,EST,LEVL III    Dermoid cyst of head   Discussed treatment options - will defer for now as it is small and not getting bigger.  - OFFICE/OUTPT VISIT,EST,LEVL III    Patient has been advised of split billing requirements and indicates understanding: Yes    BMI  Estimated body mass index is 26.76  "kg/m  as calculated from the following:    Height as of this encounter: 1.676 m (5' 6\").    Weight as of this encounter: 75.2 kg (165 lb 12.8 oz).       Counseling  Appropriate preventive services were addressed with this patient via screening, questionnaire, or discussion as appropriate for fall prevention, nutrition, physical activity, Tobacco-use cessation, social engagement, weight loss and cognition.  Checklist reviewing preventive services available has been given to the patient.  Reviewed patient's diet, addressing concerns and/or questions.   She is at risk for lack of exercise and has been provided with information to increase physical activity for the benefit of her well-being.   Reviewed preventive health counseling, as reflected in patient instructions       Regular exercise       Healthy diet/nutrition       Vision screening       Osteoporosis prevention/bone health       Colorectal Cancer Screening       (Tiera)menopause management    Follow-up  Return in about 4 weeks (around 8/13/2025) for BP Recheck.    Celia Kang is a 59 year old, presenting for the following:  Physical (Physical, pappe , fasting for labs ), Mass (Lump on forehead), Breast Problem (Discuss breast reduction ), Menopausal Sx, Lipids (Renew med), Refill Request (Estradoil and progesterone ), and Imm/Inj (Shingles #1 )        7/16/2025    10:55 AM   Additional Questions   Roomed by Alfredo POOL CMA   Accompanied by self      Chief Complaint   Patient presents with    Physical     Physical, pappe , fasting for labs     Mass     Lump on forehead    Breast Problem     Discuss breast reduction     Menopausal Sx    Lipids     Renew med    Refill Request     Estradoil and progesterone     Imm/Inj     Shingles #1          HPI        Hyperlipidemia Follow-Up    Are you regularly taking any medication or supplement to lower your cholesterol?   Yes- febrofinbrate   Are you having muscle aches or other side effects that you think could be " caused by your cholesterol lowering medication?  No  Medication Followup of progesterone and estradoil  Taking Medication as prescribed: yes  Side Effects:  None  Medication Helping Symptoms:  yes  Reports still getting hot flashes occasionally but not daily.  Reports excessive bleeding - irregular periods.  Heavy for 3-5 days but lasting 10-14 days.  Reports getting breast tenderness and pelvic pain prior to onset of menstrual cycle.    Concern - Lump on Forehead   Onset: x many years   Description: lump on forehead she thinks has gotten bigger   Intensity: mild  Progression of Symptoms:  she thinks has gotten bigger   Accompanying Signs & Symptoms: none   Previous history of similar problem: none   Precipitating factors:        Worsened by: none   Alleviating factors:        Improved by: none   Therapies tried and outcome:  none     Concern - Referral for Breast Reduction     Advance Care Planning     Discussed advance care planning with patient; however, patient declined at this time.        7/14/2025   General Health   How would you rate your overall physical health? Good   Feel stress (tense, anxious, or unable to sleep) Only a little   (!) STRESS CONCERN      7/14/2025   Nutrition   Three or more servings of calcium each day? (!) NO   Diet: Regular (no restrictions)   How many servings of fruit and vegetables per day? (!) 2-3   How many sweetened beverages each day? 0-1         7/14/2025   Exercise   Days per week of moderate/strenous exercise 3 days   Average minutes spent exercising at this level 60 min         7/14/2025   Social Factors   Frequency of gathering with friends or relatives Twice a week   Worry food won't last until get money to buy more No   Food not last or not have enough money for food? No   Do you have housing? (Housing is defined as stable permanent housing and does not include staying outside in a car, in a tent, in an abandoned building, in an overnight shelter, or couch-surfing.) Yes    Are you worried about losing your housing? No   Lack of transportation? No   Unable to get utilities (heat,electricity)? No         7/14/2025   Fall Risk   Fallen 2 or more times in the past year? No   Trouble with walking or balance? No          7/14/2025   Dental   Dentist two times every year? Yes         Today's PHQ-2 Score:       7/16/2025    10:48 AM   PHQ-2 ( 1999 [employer])   Q1: Little interest or pleasure in doing things 0   Q2: Feeling down, depressed or hopeless 0   PHQ-2 Score 0    Q1: Little interest or pleasure in doing things Not at all   Q2: Feeling down, depressed or hopeless Not at all   PHQ-2 Score 0       Patient-reported           7/14/2025   Substance Use   Alcohol more than 3/day or more than 7/wk No   Do you use any other substances recreationally? No     Social History     Tobacco Use    Smoking status: Never    Smokeless tobacco: Never    Tobacco comments:     none in home   Vaping Use    Vaping status: Never Used   Substance Use Topics    Alcohol use: Yes     Comment: 2 drinks a week    Drug use: No           9/30/2024   LAST FHS-7 RESULTS   1st degree relative breast or ovarian cancer No   Any relative bilateral breast cancer No   Any male have breast cancer No   Any ONE woman have BOTH breast AND ovarian cancer No   Any woman with breast cancer before 50yrs No   2 or more relatives with breast AND/OR ovarian cancer No   2 or more relatives with breast AND/OR bowel cancer No        Mammogram Screening - Mammogram every 1-2 years updated in Health Maintenance based on mutual decision making          7/14/2025   One time HIV Screening   Previous HIV test? No         7/14/2025   STI Screening   New sexual partner(s) since last STI/HIV test? No     History of abnormal Pap smear: No - age 30- 64 PAP with HPV every 5 years recommended        Latest Ref Rng & Units 5/10/2024    11:03 AM 3/30/2023     1:59 PM 3/5/2020    10:20 AM   PAP / HPV   PAP  Negative for Intraepithelial Lesion or  Malignancy (NILM)  Negative for Intraepithelial Lesion or Malignancy (NILM)     HPV 16 DNA Negative Negative  Negative  Negative    HPV 18 DNA Negative Negative  Negative  Negative    Other HR HPV Negative Negative  Positive  Negative      ASCVD Risk   The ASCVD Risk score (Parris BHAGAT, et al., 2019) failed to calculate for the following reasons:    The valid total cholesterol range is 130 to 320 mg/dL    Fracture Risk Assessment Tool  Link to Frax Calculator  Use the information below to complete the Frax calculator  : 1965  Sex: female  Weight (kg): 75.2 kg (actual weight)  Height (cm): 167.6 cm  Previous Fragility Fracture:  No  History of parent with fractured hip:  No  Current Smoking:  No  Patient has been on glucocorticoids for more than 3 months (5mg/day or more): No  Rheumatoid Arthritis on Problem List:  No  Secondary Osteoporosis on Problem List:  No  Consumes 3 or more units of alcohol per day: No  Femoral Neck BMD (g/cm2)     Reviewed and updated as needed this visit by Provider      Problems  Med Hx   Fam Hx            Past Medical History:   Diagnosis Date    Arthritis ?    I think i have it in my knees    Cellulitis and abscess of unspecified site -2005    Essential hypertension 2023    Hypoglycemia 2006    Problem list name updated by automated process. Provider to review    Lateral epicondylitis of right elbow 2021    Need for prophylactic hormone replacement therapy (postmenopausal)     birth control     Past Surgical History:   Procedure Laterality Date    BIOPSY BREAST Left 2019    SURGICAL HISTORY OF -   10/16/1997    D&C     OB History    Para Term  AB Living   3 2 2 0 1 2   SAB IAB Ectopic Multiple Live Births   1 0 0 0 0      # Outcome Date GA Lbr Moy/2nd Weight Sex Type Anes PTL Lv   3 Term            2 Term            1 SAB              Lab work is in process  Labs reviewed in EPIC  BP Readings from Last 3 Encounters:   25 138/86    05/10/24 128/72   23 138/82    Wt Readings from Last 3 Encounters:   25 75.2 kg (165 lb 12.8 oz)   05/10/24 78.5 kg (173 lb 1.6 oz)   23 76.6 kg (168 lb 14.4 oz)                  Patient Active Problem List   Diagnosis    Dermatitis due to cosmetics    Mixed hyperlipidemia    Family history of polyps in the colon    Vitamin D deficiency    Pain in both knees, unspecified chronicity    Hypertriglyceridemia    Menopausal syndrome (hot flashes)    Vasomotor symptoms due to menopause    Right lateral epicondylitis    Dysfunction of both eustachian tubes    Irregular periods    Essential hypertension    Cervical high risk HPV (human papillomavirus) test positive    Chronic upper back pain    Abnormal breast tissue     Past Surgical History:   Procedure Laterality Date    BIOPSY BREAST Left 2019    SURGICAL HISTORY OF -   10/16/1997    D&C       Social History     Tobacco Use    Smoking status: Never    Smokeless tobacco: Never    Tobacco comments:     none in home   Substance Use Topics    Alcohol use: Yes     Comment: 2 drinks a week     Family History   Problem Relation Age of Onset    Arthritis Mother     Cerebrovascular Disease Mother     Cancer Father         SKIN    Heart Disease Father         heart surgery - ?valve    Gastrointestinal Disease Father         colon polyps    Lipids Sister     Alcohol/Drug Brother     Allergies Sister     Genitourinary Problems Sister         KIDNEY STONES    Obesity Sister     Lung Cancer Sister 68        stage 4    Obesity Sister     Cancer - colorectal Maternal Grandmother     Colon Cancer Maternal Grandmother         I believe she did, don t know details,  before i was born    Depression Daughter     Anxiety Disorder Daughter     Depression Daughter     Breast Cancer No family hx of          Current Outpatient Medications   Medication Sig Dispense Refill    CALTRATE 600 + D 600-200 MG-IU OR TABS 1 TABLET DAILY      cetirizine (ZYRTEC) 10 MG tablet Take 10  "mg by mouth daily      Cholecalciferol (VITAMIN D3) 2000 UNITS TABS Take  by mouth. 1 tablet daily      estradiol (ESTRACE) 1 MG tablet Take 1.5 tablets (1.5 mg) by mouth daily. 135 tablet 3    fenofibrate (LOFIBRA) 54 MG tablet Take 1 tablet (54 mg) by mouth daily. 90 tablet 3    hydrocortisone (CORTAID) 1 % external cream Apply topically 2 times daily as needed 30 g 1    Multiple Vitamins-Minerals (MULTIVITAMIN OR) Take  by mouth. 1 tablet daily      OMEGA-3 CAPS   OR 2 tablets daily  0    polyethylene glycol (MIRALAX) 17 GM/SCOOP powder Take 17 g (1 capful) by mouth daily 578 g 9    progesterone (PROMETRIUM) 100 MG capsule Take 1 capsule (100 mg) by mouth daily. 90 capsule 3    Psyllium (METAMUCIL PO) Take by mouth daily.      TURMERIC-GINGER PO Take by mouth daily.      VITAMIN C 500 MG OR TABS Take by mouth daily.       Allergies   Allergen Reactions    Amoxicillin-Pot Clavulanate Rash    Cephalosporins Hives    Pcn [Penicillins] Hives     systemic    Sulfa Antibiotics Rash     Recent Labs   Lab Test 05/10/24  1023 03/20/23  0828 03/05/20  1019   * 107* 196*   HDL 40* 36* 62   TRIG 885* 873* 269*   CR 0.76  --   --    GFRESTIMATED >90  --   --    POTASSIUM 3.9  --   --    TSH 2.44  --   --                Review of Systems  Constitutional, HEENT, cardiovascular, pulmonary, GI, , musculoskeletal, neuro, skin, endocrine and psych systems are negative, except as otherwise noted.     Objective    Exam  /86   Pulse 69   Temp 97.2  F (36.2  C) (Tympanic)   Ht 1.676 m (5' 6\")   Wt 75.2 kg (165 lb 12.8 oz)   SpO2 99%   BMI 26.76 kg/m     Estimated body mass index is 26.76 kg/m  as calculated from the following:    Height as of this encounter: 1.676 m (5' 6\").    Weight as of this encounter: 75.2 kg (165 lb 12.8 oz).    Physical Exam  GENERAL: alert and no distress  EYES: Eyes grossly normal to inspection, PERRL and conjunctivae and sclerae normal  HENT: ear canals and TM's normal, nose and mouth " without ulcers or lesions  NECK: no adenopathy, no asymmetry, masses, or scars  RESP: lungs clear to auscultation - no rales, rhonchi or wheezes  BREAST: normal without masses, tenderness or nipple discharge and no palpable axillary masses or adenopathy  CV: regular rate and rhythm, normal S1 S2, no S3 or S4, no murmur, click or rub, no peripheral edema  ABDOMEN: soft, nontender, no hepatosplenomegaly, no masses and bowel sounds normal   (female): normal female external genitalia, normal urethral meatus, normal vaginal mucosa  MS: no gross musculoskeletal defects noted, no edema  SKIN: no suspicious lesions or rashes  NEURO: Normal strength and tone, mentation intact and speech normal  PSYCH: mentation appears normal, affect normal/bright        Signed Electronically by: Leola Reyna, DNP

## 2025-07-16 NOTE — NURSING NOTE
Prior to immunization administration, verified patients identity using patient s name and date of birth. Please see Immunization Activity for additional information.     Screening Questionnaire for Adult Immunization    Are you sick today?   No   Do you have allergies to medications, food, a vaccine component or latex?   No   Have you ever had a serious reaction after receiving a vaccination?   No   Do you have a long-term health problem with heart, lung, kidney, or metabolic disease (e.g., diabetes), asthma, a blood disorder, no spleen, complement component deficiency, a cochlear implant, or a spinal fluid leak?  Are you on long-term aspirin therapy?   No   Do you have cancer, leukemia, HIV/AIDS, or any other immune system problem?   No   Do you have a parent, brother, or sister with an immune system problem?   No   In the past 3 months, have you taken medications that affect  your immune system, such as prednisone, other steroids, or anticancer drugs; drugs for the treatment of rheumatoid arthritis, Crohn s disease, or psoriasis; or have you had radiation treatments?   No   Have you had a seizure, or a brain or other nervous system problem?   No   During the past year, have you received a transfusion of blood or blood    products, or been given immune (gamma) globulin or antiviral drug?   No   For women: Are you pregnant or is there a chance you could become       pregnant during the next month?   No   Have you received any vaccinations in the past 4 weeks?   No     Immunization questionnaire answers were all negative.      Patient instructed to remain in clinic for 15 minutes afterwards, and to report any adverse reactions.     Screening performed by Alfredo Ely CMA on 7/16/2025 at 11:33 AM.

## 2025-07-17 ENCOUNTER — PATIENT OUTREACH (OUTPATIENT)
Dept: CARE COORDINATION | Facility: CLINIC | Age: 60
End: 2025-07-17
Payer: COMMERCIAL

## 2025-07-21 ENCOUNTER — PATIENT OUTREACH (OUTPATIENT)
Dept: CARE COORDINATION | Facility: CLINIC | Age: 60
End: 2025-07-21
Payer: COMMERCIAL

## 2025-07-21 LAB
BKR AP ASSOCIATED HPV REPORT: NORMAL
BKR LAB AP GYN ADEQUACY: NORMAL
BKR LAB AP GYN INTERPRETATION: NORMAL
BKR LAB AP LMP: NORMAL
BKR LAB AP PREVIOUS ABNORMAL: NORMAL
PATH REPORT.COMMENTS IMP SPEC: NORMAL
PATH REPORT.COMMENTS IMP SPEC: NORMAL
PATH REPORT.RELEVANT HX SPEC: NORMAL

## 2025-08-04 ENCOUNTER — HOSPITAL ENCOUNTER (OUTPATIENT)
Dept: ULTRASOUND IMAGING | Facility: CLINIC | Age: 60
Discharge: HOME | End: 2025-08-04
Attending: NURSE PRACTITIONER | Admitting: NURSE PRACTITIONER
Payer: COMMERCIAL

## 2025-08-04 DIAGNOSIS — N92.6 IRREGULAR PERIODS: ICD-10-CM

## 2025-08-04 PROCEDURE — 76856 US EXAM PELVIC COMPLETE: CPT

## 2025-08-06 ENCOUNTER — PATIENT OUTREACH (OUTPATIENT)
Dept: CARE COORDINATION | Facility: CLINIC | Age: 60
End: 2025-08-06
Payer: COMMERCIAL

## 2025-08-07 ENCOUNTER — OFFICE VISIT (OUTPATIENT)
Dept: OBGYN | Facility: CLINIC | Age: 60
End: 2025-08-07
Attending: NURSE PRACTITIONER
Payer: COMMERCIAL

## 2025-08-07 VITALS
BODY MASS INDEX: 26.52 KG/M2 | TEMPERATURE: 99.5 F | HEART RATE: 85 BPM | DIASTOLIC BLOOD PRESSURE: 87 MMHG | HEIGHT: 66 IN | SYSTOLIC BLOOD PRESSURE: 155 MMHG | WEIGHT: 165 LBS | RESPIRATION RATE: 18 BRPM

## 2025-08-07 DIAGNOSIS — N92.6 IRREGULAR PERIODS: ICD-10-CM

## 2025-08-07 DIAGNOSIS — N93.9 ABNORMAL UTERINE BLEEDING (AUB): Primary | ICD-10-CM

## 2025-08-07 LAB
EST. AVERAGE GLUCOSE BLD GHB EST-MCNC: 103 MG/DL
HBA1C MFR BLD: 5.2 % (ref 0–5.6)
HCG UR QL: NEGATIVE
INTERNAL QC OK POCT: NORMAL
POCT KIT EXPIRATION DATE: NORMAL
POCT KIT LOT NUMBER: NORMAL
TSH SERPL DL<=0.005 MIU/L-ACNC: 0.88 UIU/ML (ref 0.3–4.2)

## 2025-08-18 ENCOUNTER — TELEPHONE (OUTPATIENT)
Dept: FAMILY MEDICINE | Facility: CLINIC | Age: 60
End: 2025-08-18
Payer: COMMERCIAL

## 2025-08-27 ENCOUNTER — OFFICE VISIT (OUTPATIENT)
Dept: OBGYN | Facility: CLINIC | Age: 60
End: 2025-08-27
Payer: COMMERCIAL

## 2025-08-27 ENCOUNTER — TELEPHONE (OUTPATIENT)
Dept: OBGYN | Facility: CLINIC | Age: 60
End: 2025-08-27

## 2025-08-27 ENCOUNTER — PREP FOR PROCEDURE (OUTPATIENT)
Dept: OBGYN | Facility: CLINIC | Age: 60
End: 2025-08-27

## 2025-08-27 VITALS
WEIGHT: 166.38 LBS | TEMPERATURE: 98.1 F | BODY MASS INDEX: 26.74 KG/M2 | HEART RATE: 71 BPM | HEIGHT: 66 IN | DIASTOLIC BLOOD PRESSURE: 83 MMHG | RESPIRATION RATE: 18 BRPM | SYSTOLIC BLOOD PRESSURE: 153 MMHG

## 2025-08-27 DIAGNOSIS — N93.9 ABNORMAL UTERINE BLEEDING (AUB): Primary | ICD-10-CM

## 2025-08-27 PROCEDURE — 99214 OFFICE O/P EST MOD 30 MIN: CPT | Performed by: OBSTETRICS & GYNECOLOGY

## 2025-08-27 PROCEDURE — 3077F SYST BP >= 140 MM HG: CPT | Performed by: OBSTETRICS & GYNECOLOGY

## 2025-08-27 PROCEDURE — 3079F DIAST BP 80-89 MM HG: CPT | Performed by: OBSTETRICS & GYNECOLOGY

## 2025-08-27 RX ORDER — ACETAMINOPHEN 325 MG/1
975 TABLET ORAL ONCE
OUTPATIENT
Start: 2025-08-27 | End: 2025-08-27

## 2025-09-01 ENCOUNTER — HOSPITAL ENCOUNTER (EMERGENCY)
Facility: CLINIC | Age: 60
Discharge: HOME | End: 2025-09-01
Attending: NURSE PRACTITIONER | Admitting: NURSE PRACTITIONER
Payer: COMMERCIAL

## 2025-09-01 ENCOUNTER — MYC MEDICAL ADVICE (OUTPATIENT)
Dept: OBGYN | Facility: CLINIC | Age: 60
End: 2025-09-01

## 2025-09-01 VITALS
HEART RATE: 78 BPM | RESPIRATION RATE: 16 BRPM | TEMPERATURE: 98.4 F | SYSTOLIC BLOOD PRESSURE: 169 MMHG | OXYGEN SATURATION: 98 % | DIASTOLIC BLOOD PRESSURE: 96 MMHG

## 2025-09-01 DIAGNOSIS — H60.12 CELLULITIS OF HELIX OF LEFT EAR: Primary | ICD-10-CM

## 2025-09-01 PROCEDURE — G0463 HOSPITAL OUTPT CLINIC VISIT: HCPCS | Performed by: NURSE PRACTITIONER

## 2025-09-01 PROCEDURE — 99203 OFFICE O/P NEW LOW 30 MIN: CPT | Performed by: NURSE PRACTITIONER

## 2025-09-01 RX ORDER — VALACYCLOVIR HYDROCHLORIDE 1 G/1
1000 TABLET, FILM COATED ORAL 3 TIMES DAILY
Qty: 21 TABLET | Refills: 0 | Status: SHIPPED | OUTPATIENT
Start: 2025-09-01 | End: 2025-09-08

## 2025-09-01 RX ORDER — DOXYCYCLINE 100 MG/1
100 CAPSULE ORAL 2 TIMES DAILY
Qty: 14 CAPSULE | Refills: 0 | Status: SHIPPED | OUTPATIENT
Start: 2025-09-01 | End: 2025-09-08

## 2025-09-01 ASSESSMENT — ACTIVITIES OF DAILY LIVING (ADL): ADLS_ACUITY_SCORE: 41

## 2025-09-01 ASSESSMENT — COLUMBIA-SUICIDE SEVERITY RATING SCALE - C-SSRS
6. HAVE YOU EVER DONE ANYTHING, STARTED TO DO ANYTHING, OR PREPARED TO DO ANYTHING TO END YOUR LIFE?: NO
1. IN THE PAST MONTH, HAVE YOU WISHED YOU WERE DEAD OR WISHED YOU COULD GO TO SLEEP AND NOT WAKE UP?: NO
2. HAVE YOU ACTUALLY HAD ANY THOUGHTS OF KILLING YOURSELF IN THE PAST MONTH?: NO

## 2025-09-02 ENCOUNTER — MYC MEDICAL ADVICE (OUTPATIENT)
Dept: FAMILY MEDICINE | Facility: CLINIC | Age: 60
End: 2025-09-02
Payer: COMMERCIAL

## 2025-09-04 ENCOUNTER — OFFICE VISIT (OUTPATIENT)
Dept: FAMILY MEDICINE | Facility: CLINIC | Age: 60
End: 2025-09-04
Payer: COMMERCIAL

## 2025-09-04 VITALS
TEMPERATURE: 97.5 F | HEART RATE: 67 BPM | SYSTOLIC BLOOD PRESSURE: 159 MMHG | BODY MASS INDEX: 27.19 KG/M2 | HEIGHT: 66 IN | WEIGHT: 169.2 LBS | RESPIRATION RATE: 16 BRPM | OXYGEN SATURATION: 100 % | DIASTOLIC BLOOD PRESSURE: 85 MMHG

## 2025-09-04 DIAGNOSIS — H60.12 CELLULITIS OF HELIX OF LEFT EAR: Primary | ICD-10-CM

## 2025-09-04 ASSESSMENT — PAIN SCALES - GENERAL: PAINLEVEL_OUTOF10: MILD PAIN (1)

## (undated) RX ORDER — LIDOCAINE HYDROCHLORIDE 10 MG/ML
INJECTION, SOLUTION EPIDURAL; INFILTRATION; INTRACAUDAL; PERINEURAL
Status: DISPENSED
Start: 2019-08-21